# Patient Record
Sex: MALE | Race: WHITE | URBAN - NONMETROPOLITAN AREA
[De-identification: names, ages, dates, MRNs, and addresses within clinical notes are randomized per-mention and may not be internally consistent; named-entity substitution may affect disease eponyms.]

---

## 2017-11-27 ENCOUNTER — ONCOLOGY VISIT (OUTPATIENT)
Dept: RADIATION ONCOLOGY | Facility: HOSPITAL | Age: 82
End: 2017-11-27
Attending: RADIOLOGY
Payer: MEDICARE

## 2017-11-27 VITALS
BODY MASS INDEX: 30.62 KG/M2 | WEIGHT: 202 LBS | SYSTOLIC BLOOD PRESSURE: 106 MMHG | HEART RATE: 56 BPM | HEIGHT: 68 IN | DIASTOLIC BLOOD PRESSURE: 58 MMHG

## 2017-11-27 DIAGNOSIS — C61 PROSTATE CANCER (H): Primary | ICD-10-CM

## 2017-11-27 PROCEDURE — 99213 OFFICE O/P EST LOW 20 MIN: CPT | Performed by: RADIOLOGY

## 2017-11-27 RX ORDER — GABAPENTIN 300 MG/1
CAPSULE ORAL
COMMUNITY
Start: 2017-08-01

## 2017-11-27 RX ORDER — NITROGLYCERIN 0.4 MG/1
0.4 TABLET SUBLINGUAL
COMMUNITY
Start: 2015-03-16

## 2017-11-27 RX ORDER — HYDROCODONE BITARTRATE AND ACETAMINOPHEN 5; 325 MG/1; MG/1
TABLET ORAL
COMMUNITY
Start: 2018-01-19 | End: 2018-01-10

## 2017-11-27 RX ORDER — INSULIN GLARGINE 100 [IU]/ML
INJECTION, SOLUTION SUBCUTANEOUS
COMMUNITY
Start: 2015-03-16

## 2017-11-27 RX ORDER — TAMSULOSIN HYDROCHLORIDE 0.4 MG/1
0.4 CAPSULE ORAL
COMMUNITY
Start: 2017-03-09

## 2017-11-27 RX ORDER — ATORVASTATIN CALCIUM 20 MG/1
10 TABLET, FILM COATED ORAL
COMMUNITY
Start: 2015-03-16

## 2017-11-27 RX ORDER — WARFARIN SODIUM 5 MG/1
TABLET ORAL
COMMUNITY
Start: 2017-03-24

## 2017-11-27 RX ORDER — LOSARTAN POTASSIUM 25 MG/1
25 TABLET ORAL
COMMUNITY
Start: 2017-07-14

## 2017-11-27 RX ORDER — ISOSORBIDE MONONITRATE 30 MG/1
30 TABLET, EXTENDED RELEASE ORAL
COMMUNITY
Start: 2015-03-16

## 2017-11-27 RX ORDER — BUSPIRONE HYDROCHLORIDE 10 MG/1
TABLET ORAL
COMMUNITY
Start: 2015-03-16

## 2017-11-27 ASSESSMENT — PAIN SCALES - GENERAL: PAINLEVEL: MODERATE PAIN (4)

## 2017-11-27 NOTE — CONSULTS
RADIATION THERAPY CONSULTATION      REFERRING PHYSICIANS:  David Saldaña MD; Marcial Vera DO; Singh Hare MD.      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        HISTORY:  Chelsea Harden has been followed for several years by Dr. Vera and subsequently Dr. Saldaña as well.  He had had issues with rising PSA, previous benign biopsies, urinary retention.  For a while he did, I believe, have an indwelling Garner catheter.  It would appear that his diagnosis of prostate carcinoma dates back to approximately 2015 when his PSA had risen to 18.  He responded somewhat initially to androgen suppression, subsequently had a rising PSA in spite of that, but again has had a nice secondary response in terms of PSA to enzalutamide.  From a symptomatic standpoint, he is doing reasonably well.  Nocturia, one to two times per night.  No rectal problems or diarrhea.      PAST MEDICAL AND SURGICAL HISTORY:   1.  Pacemaker for sick sinus syndrome   2.  Back surgery.      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      ALLERGIES:  ACE inhibitors.      REVIEW OF SYSTEMS:  No diplopia, headaches, dizziness, loss of consciousness.  No swallowing difficulties, odynophagia or dysphagia.  No thermal or temperature intolerance.  No nausea, vomiting, heartburn.  No orthopnea, dyspnea, palpitations.  No abdominal complaints.  Energy level.  Bilateral lower extremity pain which he rates a 4/10.  Diet, general.  Weight, stable.      FAMILY HISTORY:  Two brothers with prostate carcinoma.  Father leukemia.      HABITS:  Alcohol use, occasional.  Tobacco use:  Quit over 20 years ago.      SOCIAL AND DEMOGRAPHIC:  The patient is  and lives with his spouse.  He has 3 adult offspring.  He is a retired truck   from the mines.      PHYSICAL EXAMINATION:   GENERAL:  Reveals a somewhat chronically ill-appearing male.   VITAL SIGNS:  Weight is 201.9 pounds, blood pressure 106/58, heart rate 56.   HEENT:  Extraocular movements, full.  Pupils are equal, round, reactive.  Face, symmetric.  Palate and tongue, midline and symmetric.   NECK:  No cervical or supraclavicular lymphadenopathy.   LUNGS:  Relatively clear to auscultation.   HEART:  Cardiac exam reveals regular rate and rhythm without obvious murmurs or extra sounds.   CHEST:  No axillary adenopathy.   ABDOMEN:  Nontender, without appreciable organomegaly.  No inguinal lymphadenopathy.   EXTREMITIES:  Extremity strength is intact.   NEUROLOGIC:  Deep tendon reflexes, symmetric.      RADIOGRAPHIC FINDINGS:  Several CTs I reviewed over the years and do seem to show a modest-sized prostate without evidence of extensive local disease or extraprostatic extension or adenopathy.      IMPRESSION:  Likely hormone refractory and previously symptomatic prostate carcinoma in an 83-year-old male.  I had a full discussion with him regarding the possible role of radiation therapy in local control of high risk prostate carcinoma.  Certainly no one knows what his future may hold; however, I think we could certainly design a reasonable course of treatment 7-8 weeks, daily weekday outpatient treatment.  I discussed with the patient and his wife side effects, cystitis, proctitis, fatigue, diarrhea, increased urinary frequency, up to and including late complications such as serious rectal or bladder injury, possibly a 2% risk of ulceration or fistula and loss of either of these organ systems.  All in all, the patient is well informed and wishes to proceed.        PLAN:  Appropriate scheduling will be accomplished for treatment simulation and planning.         DAJA BERNARDO MD             D: 11/27/2017 11:18   T: 11/27/2017 12:07   MT: MARIEL      Name:     JOHN MCMANUS   MRN:       [id number]        Account:       [id number]   :      1934           Consult Date:  2017      Document: [id number]       cc: Singh Oglesby MD

## 2017-11-27 NOTE — PATIENT INSTRUCTIONS
One week before your simulation begin avoiding food that you know causes you gas.    Stay well hydrated throughout treatment.    Two days before your sim start taking Gas-x (Simethicone 125 mg) three times per day, after meals.  Start on Rory 12/3/17     Continue taking Gas-X every day until you are finished with your radiation treatments. (Including weekends).    Do your best to have a bowel movement before every appointment    On the day of your simulation:      Do not eat or drink anything for 2 hours before your appointment.  Stop eating/drinking at 8:30 a.m.      Empty bladder and drink 12 ounces of water at 9:45 a.m.  DO NOT empty bladder again until after your appointment.    Continue this throughout treatment.

## 2017-11-27 NOTE — PROGRESS NOTES
"INITIAL PATIENT ASSESSMENT    Referring Physician: Tray  Other Physicians: Ananya Vera    Diagnosis: Prostate Cancer    Prior radiation therapy: None    Prior chemotherapy: None    Prior hormonal therapy:Yes: Lupron    Pain Eval:  Current history of pain associated with this visit:   Intensity: 4/10  Current: aching  Location: bilat. legs  Treatment: hydrocodone      Psychosocial  Marital Status:    Spouse/Significant other: Jaja   Children: 3   Occupation: -mines    Retired: Yes  Living arrangements: home with wife  Do you feel safe at home? Yes  Activity status: ambulates with assistive device   referral needs: Not needed    Advanced Directive: No    Patient was assessed for the influenza, pneumo-poly, prevnar 13, Tdap, and shingles immunizations. \"Vaccinations and Cancer Treatment\" flyer given.  Instructed patient to discuss vaccination status with his/her PCM.   Patient was assessed using the NCCN psychosocial distress thermometer. Patient rated the score as a 6/10. Patient rated current stressors as \"can't do nothing\"--related to his back pain and leg numbness. Has an appointment in [place] for his back later this week. Stressors will be brought to the attention of provider or Oncology RN Care Coordinator for a score of 6 or greater or per nurses discretion.     Pt is here today for a consult for radiation therapy for prostate cancer.  Educated patient on the mapping process and the possible side effects of XRT to the esophagus, to include: fatigue, skin reaction, diarrhea, and bladder irritation.  Pt verbalizes an understanding and has no questions at this time. Pt is accompanied by his wife, Jaja today.    ROLevel 3- Verification of admission data and rooming completed.  Verification of medication and allergies completed.  Education per individual patient/family needs completed and documented.  Assessment and side-effect management completed.  Necessary " information gathered and reported to provider, time spent assisting patient or coordinating patient needs approximately 45 minutes.  Ludmila Silver RN

## 2017-11-27 NOTE — MR AVS SNAPSHOT
After Visit Summary   11/27/2017    Chelsea Harden    MRN: [id number]           Patient Information     Date Of Birth          2/22/1934        Visit Information        Provider Department      11/27/2017 10:00 AM Emerson Costello MD [facility]        Care Instructions      One week before your simulation begin avoiding food that you know causes you gas.    Stay well hydrated throughout treatment.    Two days before your sim start taking Gas-x (Simethicone 125 mg) three times per day, after meals.  Start on Rory 12/3/17     Continue taking Gas-X every day until you are finished with your radiation treatments. (Including weekends).    Do your best to have a bowel movement before every appointment    On the day of your simulation:      Do not eat or drink anything for 2 hours before your appointment.  Stop eating/drinking at 8:30 a.m.      Empty bladder and drink 12 ounces of water at 9:45 a.m.  DO NOT empty bladder again until after your appointment.    Continue this throughout treatment.              Follow-ups after your visit        Your next 10 appointments already scheduled     Dec 05, 2017 10:30 AM CST   Simulation with [facility]   [facility] ([facility] )    [address] [place] [id number] [phone]              Who to contact     If you have questions or need follow up information about today's clinic visit or your schedule please contact [facility] directly at [phone].  Normal or non-critical lab and imaging results will be communicated to you by MyChart, letter or phone within 4 business days after the clinic has received the results. If you do not hear from us within 7 days, please contact the clinic through MyChart or phone. If you have a critical or abnormal lab result, we will notify you by phone as soon as possible.  Submit refill requests through d[employer]out or call your pharmacy and they will forward the refill  "request to us. Please allow 3 business days for your refill to be completed.          Additional Information About Your Visit        MyChart Information     [employer] lets you send messages to your doctor, view your test results, renew your prescriptions, schedule appointments and more. To sign up, go to [email or web address] . Click on \"Log in\" on the left side of the screen, which will take you to the Welcome page. Then click on \"Sign up Now\" on the right side of the page.     You will be asked to enter the access code listed below, as well as some personal information. Please follow the directions to create your username and password.     Your access code is: [email or web address]  Expires: 2018 11:17 AM     Your access code will  in 90 days. If you need help or a new code, please call your [facility] clinic or [phone].        Care EveryWhere ID     This is your Care EveryWhere ID. This could be used by other organizations to access your [facility] medical records  [id number]        Your Vitals Were     Pulse Height BMI (Body Mass Index)             56 1.727 m (5' 8\") 30.71 kg/m2          Blood Pressure from Last 3 Encounters:   17 106/58    Weight from Last 3 Encounters:   17 91.6 kg (202 lb)              We Performed the Following     Full Code        Primary Care Provider    None Specified       No primary provider on file.        Equal Access to Services     [facility]: Hadii chio arguetao Solazarus, waaxda luqadaha, qaybta kaalmada shravan, mayito sorensen . So [facility] [phone].    ATENCIÓN: Si habla español, tiene a ramirez disposición servicios gratuitos de asistencia lingüística. Llame al [phone].    We comply with applicable federal civil rights laws and [place] laws. We do not discriminate on the basis of race, color, national origin, age, disability, sex, sexual orientation, or gender identity.            Thank you!     Thank you for choosing [facility]  for your care. Our goal is always to provide you with excellent care. Hearing back from our patients is one way we can continue to improve our services. Please take a few minutes to complete the written survey that you may receive in the mail after your visit with us. Thank you!             Your Updated Medication List - Protect others around you: Learn how to safely use, store and throw away your medicines at www.disposemymeds.org.          This list is accurate as of: 11/27/17 11:21 AM.  Always use your most recent med list.                   Brand Name Dispense Instructions for use Diagnosis    atorvastatin 20 MG tablet    LIPITOR     Take 10 mg by mouth        busPIRone 10 MG tablet    BUSPAR     1 1/2 tabs twice daily        enzalutamide 40 MG capsule    XTANDI     Take 80 mg by mouth        gabapentin 300 MG capsule    NEURONTIN     2 tablets morning, 2 afternoon, 3 at bedtime.        HYDROcodone-acetaminophen 5-325 MG per tablet   Start taking on:  1/19/2018    NORCO     ONE  TABLET  TWICE PRN PAIN Limit acetaminophen to 4000 mg per day from all sources.        insulin glargine 100 UNIT/ML injection    LANTUS     Inject 30 units daily under the skin        ipratropium 17 MCG/ACT Inhaler    ATROVENT HFA     Inhale 2 puffs into the lungs        isosorbide mononitrate 30 MG 24 hr tablet    IMDUR     Take 30 mg by mouth        leuprolide 45 MG kit    LUPRON DEPOT     SHOT EVERY 6 MONTHS        losartan 25 MG tablet    COZAAR     Take 25 mg by mouth        metFORMIN 500 MG tablet    GLUCOPHAGE     Take 500 mg by mouth        nitroGLYcerin 0.4 MG sublingual tablet    NITROSTAT     Place 0.4 mg under the tongue        tamsulosin 0.4 MG capsule    FLOMAX     Take 0.4 mg by mouth        warfarin 5 MG tablet    COUMADIN     2.5 mg daily   7.5 mg M-F  Monitored by irena BOLANOS

## 2017-12-05 ENCOUNTER — ALLIED HEALTH/NURSE VISIT (OUTPATIENT)
Dept: RADIATION ONCOLOGY | Facility: HOSPITAL | Age: 82
End: 2017-12-05
Attending: RADIOLOGY
Payer: MEDICARE

## 2017-12-05 DIAGNOSIS — C61 PROSTATE CANCER (H): Primary | ICD-10-CM

## 2017-12-05 PROCEDURE — 77334 RADIATION TREATMENT AID(S): CPT | Performed by: RADIOLOGY

## 2017-12-05 NOTE — MR AVS SNAPSHOT
After Visit Summary   12/5/2017    Chelsea Harden    MRN: [id number]           Patient Information     Date Of Birth          2/22/1934        Visit Information        Provider Department      12/5/2017 10:30 AM [facility]        Today's Diagnoses     Prostate cancer (H)    -  1       Follow-ups after your visit        Your next 10 appointments already scheduled     Dec 14, 2017  3:00 PM CST   Treatment with [facility] CL[facility] IX   [facility] ([facility] )    [address] [address]   [phone]            Dec 15, 2017 10:45 AM CST   Treatment with [facility]   [facility] ([facility] )    [address] [address]   [phone]            Dec 18, 2017 10:45 AM CST   Treatment with [facility]   [facility] ([facility] )    [address] [address]   [phone]            Dec 19, 2017 10:45 AM CST   Treatment with [facility]   [facility] ([facility] )    [address] [address]   [phone]            Dec 20, 2017 10:45 AM CST   Treatment with [facility]   [facility] ([facility] )    [address] [address]   [phone]            Dec 21, 2017 10:45 AM CST   Treatment with [facility]   [facility] ([facility] )    [address] [address]   [phone]            Dec 22, 2017 10:45 AM CST   Treatment with [facility]   [facility] ([facility] )    [address] [address]   [phone]            Dec 26, 2017 10:45 AM CST   Treatment with [facility]   [facility] ([facility] )    [address] [address]   [phone]            Dec 27, 2017 10:45 AM CST   Treatment with [facility]   [facility] ([facility] )  "   [address] [id number] [phone]            Dec 28, 2017 10:45 AM CST   Treatment with [facility] CL[facility] IX   [facility] ([facility] )    [address] [id number] [phone]              Who to contact     If you have questions or need follow up information about today's clinic visit or your schedule please contact [facility] directly at [phone].  Normal or non-critical lab and imaging results will be communicated to you by [employer], letter or phone within 4 business days after the clinic has received the results. If you do not hear from us within 7 days, please contact the clinic through [employer] or phone. If you have a critical or abnormal lab result, we will notify you by phone as soon as possible.  Submit refill requests through "[employer] or call your pharmacy and they will forward the refill request to us. Please allow 3 business days for your refill to be completed.          Additional Information About Your Visit        "[employer] Information     "[employer] lets you send messages to your doctor, view your test results, renew your prescriptions, schedule appointments and more. To sign up, go to [email or web address] . Click on \"Log in\" on the left side of the screen, which will take you to the Welcome page. Then click on \"Sign up Now\" on the right side of the page.     You will be asked to enter the access code listed below, as well as some personal information. Please follow the directions to create your username and password.     Your access code is: [email or web address]  Expires: 2018 11:17 AM     Your access code will  in 90 days. If you need help or a new code, please call your [facility] clinic or [phone].        Care EveryWhere ID     This is your Care EveryWhere ID. This could be used by other organizations to access your [facility] medical records  [id number]         Blood Pressure from Last 3 Encounters:   17 " 106/58    Weight from Last 3 Encounters:   11/27/17 91.6 kg (202 lb)              Today, you had the following     No orders found for display       Primary Care Provider    None Specified       No primary provider on file.        Equal Access to Services     [facility] : Hadii chio fields wild Lombardo, wasarahda luqadaha, qaybta kaalmada shravan, mayito farrisdyllan hany. So [facility] [phone].    ATENCIÓN: Si habla español, tiene a ramirez disposición servicios gratuitos de asistencia lingüística. Llame al [phone].    We comply with applicable federal civil rights laws and [place] laws. We do not discriminate on the basis of race, color, national origin, age, disability, sex, sexual orientation, or gender identity.            Thank you!     Thank you for choosing [facility]  for your care. Our goal is always to provide you with excellent care. Hearing back from our patients is one way we can continue to improve our services. Please take a few minutes to complete the written survey that you may receive in the mail after your visit with us. Thank you!             Your Updated Medication List - Protect others around you: Learn how to safely use, store and throw away your medicines at www.disposemymeds.org.          This list is accurate as of: 12/5/17  1:30 PM.  Always use your most recent med list.                   Brand Name Dispense Instructions for use Diagnosis    atorvastatin 20 MG tablet    LIPITOR     Take 10 mg by mouth        busPIRone 10 MG tablet    BUSPAR     1 1/2 tabs twice daily        enzalutamide 40 MG capsule    XTANDI     Take 80 mg by mouth        gabapentin 300 MG capsule    NEURONTIN     2 tablets morning, 2 afternoon, 3 at bedtime.        HYDROcodone-acetaminophen 5-325 MG per tablet   Start taking on:  1/19/2018    NORCO     ONE  TABLET  TWICE PRN PAIN Limit acetaminophen to 4000 mg per day from all sources.        insulin glargine 100 UNIT/ML injection    LANTUS      Inject 30 units daily under the skin        ipratropium 17 MCG/ACT Inhaler    ATROVENT HFA     Inhale 2 puffs into the lungs        isosorbide mononitrate 30 MG 24 hr tablet    IMDUR     Take 30 mg by mouth        leuprolide 45 MG kit    LUPRON DEPOT     SHOT EVERY 6 MONTHS        losartan 25 MG tablet    COZAAR     Take 25 mg by mouth        metFORMIN 500 MG tablet    GLUCOPHAGE     Take 500 mg by mouth        nitroGLYcerin 0.4 MG sublingual tablet    NITROSTAT     Place 0.4 mg under the tongue        tamsulosin 0.4 MG capsule    FLOMAX     Take 0.4 mg by mouth        warfarin 5 MG tablet    COUMADIN     2.5 mg daily   7.5 mg M-F  Monitored by irena BOLANOS

## 2017-12-12 PROCEDURE — 77338 DESIGN MLC DEVICE FOR IMRT: CPT | Performed by: RADIOLOGY

## 2017-12-12 PROCEDURE — 77300 RADIATION THERAPY DOSE PLAN: CPT | Performed by: RADIOLOGY

## 2017-12-12 PROCEDURE — 77370 RADIATION PHYSICS CONSULT: CPT | Performed by: RADIOLOGY

## 2017-12-13 PROCEDURE — 77301 RADIOTHERAPY DOSE PLAN IMRT: CPT | Performed by: RADIOLOGY

## 2017-12-14 ENCOUNTER — ALLIED HEALTH/NURSE VISIT (OUTPATIENT)
Dept: RADIATION ONCOLOGY | Facility: HOSPITAL | Age: 82
End: 2017-12-14
Attending: RADIOLOGY
Payer: MEDICARE

## 2017-12-14 DIAGNOSIS — C61 PROSTATE CANCER (H): Primary | ICD-10-CM

## 2017-12-14 PROCEDURE — 77385 ZZH IMRT TREATMENT DELIVERY, SIMPLE: CPT | Performed by: RADIOLOGY

## 2017-12-14 NOTE — MR AVS SNAPSHOT
After Visit Summary   12/14/2017    Chelsea Harden    MRN: [id number]           Patient Information     Date Of Birth          2/22/1934        Visit Information        Provider Department      12/14/2017 3:00 PM [facility]        Today's Diagnoses     Prostate cancer (H)    -  1       Follow-ups after your visit        Your next 10 appointments already scheduled     Dec 15, 2017 10:45 AM CST   Treatment with [facility]   [facility] ([facility] )    [address] [address]   [phone]            Dec 18, 2017 10:45 AM CST   Treatment with [facility]   [facility] ([facility] )    [address] [address]   [phone]            Dec 19, 2017 10:45 AM CST   Treatment with [facility]   [facility] ([facility] )    [address] [address]   [phone]            Dec 20, 2017 10:45 AM CST   Treatment with [facility]   [facility] ([facility] )    [address] [address]   [phone]            Dec 21, 2017 10:45 AM CST   Treatment with [facility]   [facility] ([facility] )    [address] [address]   [phone]            Dec 22, 2017 10:45 AM CST   Treatment with [facility]   [facility] ([facility] )    [address] [address]   [phone]            Dec 26, 2017 10:45 AM CST   Treatment with [facility]   [facility] ([facility] )    [address] [address]   [phone]            Dec 27, 2017 10:45 AM CST   Treatment with [facility]   [facility] ([facility] )    [address] [address]   [phone]            Dec 28, 2017 10:45 AM CST   Treatment with [facility]   [facility] Radiation Oncology ([facility] )  "   [address] [id number] [phone]            Dec 29, 2017 10:45 AM CST   Treatment with [facility]   [facility] Radiation Oncology ([facility] )    [address] [id number] [phone]              Who to contact     If you have questions or need follow up information about today's clinic visit or your schedule please contact [facility] directly at [phone].  Normal or non-critical lab and imaging results will be communicated to you by [employer], letter or phone within 4 business days after the clinic has received the results. If you do not hear from us within 7 days, please contact the clinic through [employer] or phone. If you have a critical or abnormal lab result, we will notify you by phone as soon as possible.  Submit refill requests through "[employer] or call your pharmacy and they will forward the refill request to us. Please allow 3 business days for your refill to be completed.          Additional Information About Your Visit        "[employer] Information     "[employer] lets you send messages to your doctor, view your test results, renew your prescriptions, schedule appointments and more. To sign up, go to [email or web address] . Click on \"Log in\" on the left side of the screen, which will take you to the Welcome page. Then click on \"Sign up Now\" on the right side of the page.     You will be asked to enter the access code listed below, as well as some personal information. Please follow the directions to create your username and password.     Your access code is: [email or web address]  Expires: 2018 11:17 AM     Your access code will  in 90 days. If you need help or a new code, please call your [place] clinic or [phone].        Care EveryWhere ID     This is your Care EveryWhere ID. This could be used by other organizations to access your [place] medical records  [id number]         Blood Pressure from Last 3 Encounters:   17 " 106/58    Weight from Last 3 Encounters:   11/27/17 91.6 kg (202 lb)              Today, you had the following     No orders found for display       Primary Care Provider    None Specified       No primary provider on file.        Equal Access to Services     SOULEYMANEEZEQUIEL MARILOU : Hadii chio fields wild Lombardo, wasarahda luqadaha, qaybta kaalmada shravan, mayito jordan laRiandyllan hany. So [facility] [phone].    ATENCIÓN: Si habla español, tiene a ramirez disposición servicios gratuitos de asistencia lingüística. Llame al [phone].    We comply with applicable federal civil rights laws and [place] laws. We do not discriminate on the basis of race, color, national origin, age, disability, sex, sexual orientation, or gender identity.            Thank you!     Thank you for choosing [facility]  for your care. Our goal is always to provide you with excellent care. Hearing back from our patients is one way we can continue to improve our services. Please take a few minutes to complete the written survey that you may receive in the mail after your visit with us. Thank you!             Your Updated Medication List - Protect others around you: Learn how to safely use, store and throw away your medicines at www.disposemymeds.org.          This list is accurate as of: 12/14/17  3:17 PM.  Always use your most recent med list.                   Brand Name Dispense Instructions for use Diagnosis    atorvastatin 20 MG tablet    LIPITOR     Take 10 mg by mouth        busPIRone 10 MG tablet    BUSPAR     1 1/2 tabs twice daily        enzalutamide 40 MG capsule    XTANDI     Take 80 mg by mouth        gabapentin 300 MG capsule    NEURONTIN     2 tablets morning, 2 afternoon, 3 at bedtime.        HYDROcodone-acetaminophen 5-325 MG per tablet   Start taking on:  1/19/2018    NORCO     ONE  TABLET  TWICE PRN PAIN Limit acetaminophen to 4000 mg per day from all sources.        insulin glargine 100 UNIT/ML injection     LANTUS     Inject 30 units daily under the skin        ipratropium 17 MCG/ACT Inhaler    ATROVENT HFA     Inhale 2 puffs into the lungs        isosorbide mononitrate 30 MG 24 hr tablet    IMDUR     Take 30 mg by mouth        leuprolide 45 MG kit    LUPRON DEPOT     SHOT EVERY 6 MONTHS        losartan 25 MG tablet    COZAAR     Take 25 mg by mouth        metFORMIN 500 MG tablet    GLUCOPHAGE     Take 500 mg by mouth        nitroGLYcerin 0.4 MG sublingual tablet    NITROSTAT     Place 0.4 mg under the tongue        tamsulosin 0.4 MG capsule    FLOMAX     Take 0.4 mg by mouth        warfarin 5 MG tablet    COUMADIN     2.5 mg daily   7.5 mg M-F  Monitored by irena BOLANOS

## 2017-12-14 NOTE — PROGRESS NOTES
Chelsea Harden received radiation therapy treatment today 12/14/17.    Mary Avila  December 14, 2017  3:05 PM

## 2017-12-15 ENCOUNTER — ALLIED HEALTH/NURSE VISIT (OUTPATIENT)
Dept: RADIATION ONCOLOGY | Facility: HOSPITAL | Age: 82
End: 2017-12-15

## 2017-12-15 DIAGNOSIS — C61 PROSTATE CANCER (H): Primary | ICD-10-CM

## 2017-12-15 PROCEDURE — 77385 ZZH IMRT TREATMENT DELIVERY, SIMPLE: CPT | Performed by: RADIOLOGY

## 2017-12-15 NOTE — MR AVS SNAPSHOT
After Visit Summary   12/15/2017    Chelsea Harden    MRN: [id number]           Patient Information     Date Of Birth          2/22/1934        Visit Information        Provider Department      12/15/2017 10:45 AM [facility]        Today's Diagnoses     Prostate cancer (H)    -  1       Follow-ups after your visit        Your next 10 appointments already scheduled     Dec 18, 2017 10:45 AM CST   Treatment with [facility]   [facility] Radiation Oncology ([facility] )    [address] [address]   [phone]            Dec 19, 2017 10:45 AM CST   Treatment with [facility]   [facility] Radiation Oncology ([facility] )    [address] [address]   [phone]            Dec 20, 2017 10:45 AM CST   Treatment with [facility]   [facility] Rad[facility]cology ([facility] )    [address] [address]   [phone]            Dec 21, 2017 10:45 AM CST   Treatment with [facility]   [facility] Rad[facility] Oncology ([facility] )    [address] [address]   [phone]            Dec 22, 2017 10:45 AM CST   Treatment with [facility]   [facility] ([facility] )    [address] [address]   [phone]            Dec 26, 2017 10:45 AM CST   Treatment with [facility]   [facility] ([facility] )    [address] [address]   [phone]            Dec 27, 2017 10:45 AM CST   Treatment with [facility]   [facility]cology ([facility] )    [address] [address]   [phone]            Dec 28, 2017 10:45 AM CST   Treatment with [facility]   [facility] ([facility] )    [address] [address]   [phone]            Dec 29, 2017 10:45 AM CST   Treatment with [facility]   [facility] ([facility] )  "   [address] [phone] [phone]            2018 10:45 AM CST   Treatment with [facility]   [facility] Radiation Oncology ([facility] )    [address] [address] [phone]              Who to contact     If you have questions or need follow up information about today's clinic visit or your schedule please contact [facility] directly at [phone].  Normal or non-critical lab and imaging results will be communicated to you by [employer], letter or phone within 4 business days after the clinic has received the results. If you do not hear from us within 7 days, please contact the clinic through [employer] or phone. If you have a critical or abnormal lab result, we will notify you by phone as soon as possible.  Submit refill requests through [employer] or call your pharmacy and they will forward the refill request to us. Please allow 3 business days for your refill to be completed.          Additional Information About Your Visit        [employer] Information     [employer] lets you send messages to your doctor, view your test results, renew your prescriptions, schedule appointments and more. To sign up, go to [email or web address] . Click on \"Log in\" on the left side of the screen, which will take you to the Welcome page. Then click on \"Sign up Now\" on the right side of the page.     You will be asked to enter the access code listed below, as well as some personal information. Please follow the directions to create your username and password.     Your access code is: [email or web address]  Expires: 2018 11:17 AM     Your access code will  in 90 days. If you need help or a new code, please call your [place] clinic or [phone].        Care EveryWhere ID     This is your Care EveryWhere ID. This could be used by other organizations to access your [place] medical records  [id number]         Blood Pressure from Last 3 Encounters:   17 " 106/58    Weight from Last 3 Encounters:   11/27/17 91.6 kg (202 lb)              Today, you had the following     No orders found for display       Primary Care Provider    None Specified       No primary provider on file.        Equal Access to Services     SOULEYMANEEZEQUIEL MARILOU : Hadii chio fields wild [place]ardo, wasarahda luqadaha, qaybta kaalmada shravan, mayito jordan laRiandyllan leach. So [facility] [phone].    ATENCIÓN: Si habla español, tiene a ramirez disposición servicios gratuitos de asistencia lingüística. Llame al [phone].    We comply with applicable federal civil rights laws and [place] laws. We do not discriminate on the basis of race, color, national origin, age, disability, sex, sexual orientation, or gender identity.            Thank you!     Thank you for choosing [facility]  for your care. Our goal is always to provide you with excellent care. Hearing back from our patients is one way we can continue to improve our services. Please take a few minutes to complete the written survey that you may receive in the mail after your visit with us. Thank you!             Your Updated Medication List - Protect others around you: Learn how to safely use, store and throw away your medicines at www.disposemymeds.org.          This list is accurate as of: 12/15/17 11:00 AM.  Always use your most recent med list.                   Brand Name Dispense Instructions for use Diagnosis    atorvastatin 20 MG tablet    LIPITOR     Take 10 mg by mouth        busPIRone 10 MG tablet    BUSPAR     1 1/2 tabs twice daily        enzalutamide 40 MG capsule    XTANDI     Take 80 mg by mouth        gabapentin 300 MG capsule    NEURONTIN     2 tablets morning, 2 afternoon, 3 at bedtime.        HYDROcodone-acetaminophen 5-325 MG per tablet   Start taking on:  1/19/2018    NORCO     ONE  TABLET  TWICE PRN PAIN Limit acetaminophen to 4000 mg per day from all sources.        insulin glargine 100 UNIT/ML injection     LANTUS     Inject 30 units daily under the skin        ipratropium 17 MCG/ACT Inhaler    ATROVENT HFA     Inhale 2 puffs into the lungs        isosorbide mononitrate 30 MG 24 hr tablet    IMDUR     Take 30 mg by mouth        leuprolide 45 MG kit    LUPRON DEPOT     SHOT EVERY 6 MONTHS        losartan 25 MG tablet    COZAAR     Take 25 mg by mouth        metFORMIN 500 MG tablet    GLUCOPHAGE     Take 500 mg by mouth        nitroGLYcerin 0.4 MG sublingual tablet    NITROSTAT     Place 0.4 mg under the tongue        tamsulosin 0.4 MG capsule    FLOMAX     Take 0.4 mg by mouth        warfarin 5 MG tablet    COUMADIN     2.5 mg daily   7.5 mg M-F  Monitored by irena BOLANOS

## 2017-12-15 NOTE — PROGRESS NOTES
Chelsea Harden received radiation therapy treatment today 12/15/17.    Marcelo Payne  December 15, 2017  10:54 AM

## 2017-12-18 ENCOUNTER — ALLIED HEALTH/NURSE VISIT (OUTPATIENT)
Dept: RADIATION ONCOLOGY | Facility: HOSPITAL | Age: 82
End: 2017-12-18

## 2017-12-18 DIAGNOSIS — C61 PROSTATE CANCER (H): Primary | ICD-10-CM

## 2017-12-18 PROCEDURE — 77385 ZZH IMRT TREATMENT DELIVERY, SIMPLE: CPT | Performed by: RADIOLOGY

## 2017-12-18 NOTE — PROGRESS NOTES
Chelsea Harden received radiation therapy treatment today 12/18/17.    Josias Pizano  December 18, 2017  10:45 AM

## 2017-12-18 NOTE — MR AVS SNAPSHOT
After Visit Summary   12/18/2017    Chelsea Harden    MRN: [id number]           Patient Information     Date Of Birth          2/22/1934        Visit Information        Provider Department      12/18/2017 10:45 AM [facility]        Today's Diagnoses     Prostate cancer (H)    -  1       Follow-ups after your visit        Your next 10 appointments already scheduled     Dec 19, 2017 10:45 AM CST   [facility]    [address] [address]   [phone]            Dec 20, 2017 10:45 AM CST   Treatment with [facility]    [address] [address]   [phone]            Dec 21, 2017 10:45 AM CST   Treatment with [facility]    [address] [address]   [phone]            Dec 22, 2017 10:45 AM CST   Treatment with [facility]    [address] [address]   [phone]            Dec 26, 2017 10:45 AM CST   Treatment with [facility]    [address] [address]   [phone]            Dec 27, 2017 10:45 AM CST   Treatment with [facility]    [address] [address]   [phone]            Dec 28, 2017 10:45 AM CST   Treatment with [facility]    [address] [address]   [phone]            Dec 29, 2017 10:45 AM CST   Treatment with [facility]    [address] [address]   [phone]            Jan 02, 2018 10:45 AM CST   Treatment with [facility]   [facility]  "   [address] [phone] [phone]            2018 10:30 AM CST   Treatment with [facility]   [facility] Radiation Oncology ([facility] )    [address] [address] [phone]              Who to contact     If you have questions or need follow up information about today's clinic visit or your schedule please contact [facility] directly at [phone].  Normal or non-critical lab and imaging results will be communicated to you by [employer], letter or phone within 4 business days after the clinic has received the results. If you do not hear from us within 7 days, please contact the clinic through [employer] or phone. If you have a critical or abnormal lab result, we will notify you by phone as soon as possible.  Submit refill requests through [employer] or call your pharmacy and they will forward the refill request to us. Please allow 3 business days for your refill to be completed.          Additional Information About Your Visit        [employer] Information     [employer] lets you send messages to your doctor, view your test results, renew your prescriptions, schedule appointments and more. To sign up, go to [email or web address] . Click on \"Log in\" on the left side of the screen, which will take you to the Welcome page. Then click on \"Sign up Now\" on the right side of the page.     You will be asked to enter the access code listed below, as well as some personal information. Please follow the directions to create your username and password.     Your access code is: [email or web address]  Expires: 2018 11:17 AM     Your access code will  in 90 days. If you need help or a new code, please call your [place] clinic or [phone].        Care EveryWhere ID     This is your Care EveryWhere ID. This could be used by other organizations to access your [place] medical records  [id number]         Blood Pressure from Last 3 Encounters:   17 " 106/58    Weight from Last 3 Encounters:   11/27/17 91.6 kg (202 lb)              Today, you had the following     No orders found for display       Primary Care Provider    None Specified       No primary provider on file.        Equal Access to Services     SOULEYMANEEZEQUIEL MARILOU : Hadii chio fields wild [place]ardo, wasarahda luqadaha, qaybta kaalmada shravan, mayito jordan laRiandyllan hany. So [facility] [phone].    ATENCIÓN: Si habla español, tiene a ramirez disposición servicios gratuitos de asistencia lingüística. Llame al [phone].    We comply with applicable federal civil rights laws and [place] laws. We do not discriminate on the basis of race, color, national origin, age, disability, sex, sexual orientation, or gender identity.            Thank you!     Thank you for choosing [facility]  for your care. Our goal is always to provide you with excellent care. Hearing back from our patients is one way we can continue to improve our services. Please take a few minutes to complete the written survey that you may receive in the mail after your visit with us. Thank you!             Your Updated Medication List - Protect others around you: Learn how to safely use, store and throw away your medicines at www.disposemymeds.org.          This list is accurate as of: 12/18/17 10:48 AM.  Always use your most recent med list.                   Brand Name Dispense Instructions for use Diagnosis    atorvastatin 20 MG tablet    LIPITOR     Take 10 mg by mouth        busPIRone 10 MG tablet    BUSPAR     1 1/2 tabs twice daily        enzalutamide 40 MG capsule    XTANDI     Take 80 mg by mouth        gabapentin 300 MG capsule    NEURONTIN     2 tablets morning, 2 afternoon, 3 at bedtime.        HYDROcodone-acetaminophen 5-325 MG per tablet   Start taking on:  1/19/2018    NORCO     ONE  TABLET  TWICE PRN PAIN Limit acetaminophen to 4000 mg per day from all sources.        insulin glargine 100 UNIT/ML injection     LANTUS     Inject 30 units daily under the skin        ipratropium 17 MCG/ACT Inhaler    ATROVENT HFA     Inhale 2 puffs into the lungs        isosorbide mononitrate 30 MG 24 hr tablet    IMDUR     Take 30 mg by mouth        leuprolide 45 MG kit    LUPRON DEPOT     SHOT EVERY 6 MONTHS        losartan 25 MG tablet    COZAAR     Take 25 mg by mouth        metFORMIN 500 MG tablet    GLUCOPHAGE     Take 500 mg by mouth        nitroGLYcerin 0.4 MG sublingual tablet    NITROSTAT     Place 0.4 mg under the tongue        tamsulosin 0.4 MG capsule    FLOMAX     Take 0.4 mg by mouth        warfarin 5 MG tablet    COUMADIN     2.5 mg daily   7.5 mg M-F  Monitored by irena BOLANOS

## 2017-12-19 ENCOUNTER — ALLIED HEALTH/NURSE VISIT (OUTPATIENT)
Dept: RADIATION ONCOLOGY | Facility: HOSPITAL | Age: 82
End: 2017-12-19

## 2017-12-19 DIAGNOSIS — C61 PROSTATE CANCER (H): Primary | ICD-10-CM

## 2017-12-19 PROCEDURE — 77385 ZZH IMRT TREATMENT DELIVERY, SIMPLE: CPT | Performed by: RADIOLOGY

## 2017-12-19 NOTE — PROGRESS NOTES
Chelsea Harden received radiation therapy treatment today 12/19/17.    Argentina Xie  December 19, 2017  11:20 AM

## 2017-12-20 ENCOUNTER — OFFICE VISIT (OUTPATIENT)
Dept: RADIATION ONCOLOGY | Facility: HOSPITAL | Age: 82
End: 2017-12-20
Attending: RADIOLOGY
Payer: MEDICARE

## 2017-12-20 ENCOUNTER — ALLIED HEALTH/NURSE VISIT (OUTPATIENT)
Dept: RADIATION ONCOLOGY | Facility: HOSPITAL | Age: 82
End: 2017-12-20

## 2017-12-20 VITALS
HEART RATE: 60 BPM | RESPIRATION RATE: 16 BRPM | BODY MASS INDEX: 31.02 KG/M2 | SYSTOLIC BLOOD PRESSURE: 124 MMHG | DIASTOLIC BLOOD PRESSURE: 66 MMHG | WEIGHT: 204 LBS

## 2017-12-20 DIAGNOSIS — C61 PROSTATE CANCER (H): Primary | ICD-10-CM

## 2017-12-20 PROCEDURE — 77336 RADIATION PHYSICS CONSULT: CPT | Performed by: RADIOLOGY

## 2017-12-20 PROCEDURE — 77385 ZZH IMRT TREATMENT DELIVERY, SIMPLE: CPT | Performed by: RADIOLOGY

## 2017-12-20 ASSESSMENT — PAIN SCALES - GENERAL: PAINLEVEL: MODERATE PAIN (4)

## 2017-12-20 NOTE — PROGRESS NOTES
Chelsea Harden received radiation therapy treatment today 12/20/17.    Josias Pizano  December 20, 2017  10:57 AM

## 2017-12-20 NOTE — PROGRESS NOTES
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      RADIATION RX:  John Mcmanus has received 950 cGy to date for potential local management of the above-described prostate carcinoma.      SUBJECTIVE:  Doing fairly well.  No change in bowel or bladder symptomatology.  Nocturia, generally one to two times per night.  No symptoms of proctitis or diarrhea.      OBJECTIVE:  Weight, 204 pounds.  Abdomen is benign.  Bowel sounds, present.      IMPRESSION:  Routine tolerance to radiation therapy for prostate carcinoma.      PLAN:  Continue treatment as planned.         DAJA BERNARDO MD             D: 2017 11:33   T: 2017 11:47   MT: CG      Name:     JOHN MCMANUS   MRN:      [id number]        Account:      [id number]   :      1934           Service Date: 2017      Document: [id number]       cc: Singh Oglesby MD

## 2017-12-20 NOTE — MR AVS SNAPSHOT
After Visit Summary   12/20/2017    Chelsea Harden    MRN: [id number]           Patient Information     Date Of Birth          2/22/1934        Visit Information        Provider Department      12/20/2017 10:45 AM [facility]        Today's Diagnoses     Prostate cancer (H)    -  1       Follow-ups after your visit        Your next 10 appointments already scheduled     Dec 21, 2017 10:45 AM CST   Treatment with [facility]   [facility] ([facility] )    [address] [address]   [phone]            Dec 22, 2017 10:45 AM CST   Treatment with [facility]   [facility] ([facility] )    [address] [address]   [phone]            Dec 26, 2017 10:45 AM CST   Treatment with [facility]   [facility] ([facility] )    [address] [address]   [phone]            Dec 27, 2017 10:45 AM CST   Treatment with [facility]   [facility] ([facility] )    [address] [address]   [phone]            Dec 27, 2017 11:00 AM CST   on treatment visit with Emerson Costello MD   [facility] ([facility] )    [address] [address]   [phone]            Dec 28, 2017 10:45 AM CST   Treatment with [facility]   [facility] ([facility] )    [address] [address]   [phone]            Dec 29, 2017 10:45 AM CST   Treatment with [facility]   [facility] ([facility] )    [address] [address]   [phone]            Jan 02, 2018 10:45 AM CST   Treatment with [facility]   [facility] ([facility] )    [address] [address]   [phone]            Jan 03, 2018 10:30 AM CST   Treatment with [facility]   [facility] Radiation Oncology ([place]  [facility] )    [address] [address] [phone]            2018 11:00 AM CST   on treatment visit with Emerson Costello MD   [facility]cology ([facility] )    [address] [address] [phone]              Who to contact     If you have questions or need follow up information about today's clinic visit or your schedule please contact [facility] directly at [phone].  Normal or non-critical lab and imaging results will be communicated to you by MyChart, letter or phone within 4 business days after the clinic has received the results. If you do not hear from us within 7 days, please contact the clinic through [employer] or phone. If you have a critical or abnormal lab result, we will notify you by phone as soon as possible.  Submit refill requests through [employer] or call your pharmacy and they will forward the refill request to us. Please allow 3 business days for your refill to be completed.          Additional Information About Your Visit        [employer] Information     [employer] lets you send messages to your doctor, view your test results, renew your prescriptions, schedule appointments and more. To sign up, go to [email or web address] . Click on \"Log in\" on the left side of the screen, which will take you to the Welcome page. Then click on \"Sign up Now\" on the right side of the page.     You will be asked to enter the access code listed below, as well as some personal information. Please follow the directions to create your username and password.     Your access code is: [email or web address]  Expires: 2018 11:17 AM     Your access code will  in 90 days. If you need help or a new code, please call your [place] clinic or [phone].        Care EveryWhere ID     This is your Care EveryWhere ID. This could be used by other organizations to access your [place] medical records  [id number]         Blood Pressure from " Last 3 Encounters:   11/27/17 106/58    Weight from Last 3 Encounters:   11/27/17 91.6 kg (202 lb)              Today, you had the following     No orders found for display       Primary Care Provider    None Specified       No primary provider on file.        Equal Access to Services     CRISTINA ZAMARRIPA : Hadhalima chio ku ellieo Sofunmiali, waaxda luqadaha, qaybta kaalmada adecarito, mayito harshain hayaan osmelmarti jordan laRiandyllan . So [facility] [phone].    ATENCIÓN: Si habla español, tiene a ramirez disposición servicios gratuitos de asistencia lingüística. Llame al [phone].    We comply with applicable federal civil rights laws and [place] laws. We do not discriminate on the basis of race, color, national origin, age, disability, sex, sexual orientation, or gender identity.            Thank you!     Thank you for choosing [facility]  for your care. Our goal is always to provide you with excellent care. Hearing back from our patients is one way we can continue to improve our services. Please take a few minutes to complete the written survey that you may receive in the mail after your visit with us. Thank you!             Your Updated Medication List - Protect others around you: Learn how to safely use, store and throw away your medicines at www.disposemymeds.org.          This list is accurate as of: 12/20/17 11:17 AM.  Always use your most recent med list.                   Brand Name Dispense Instructions for use Diagnosis    atorvastatin 20 MG tablet    LIPITOR     Take 10 mg by mouth        busPIRone 10 MG tablet    BUSPAR     1 1/2 tabs twice daily        enzalutamide 40 MG capsule    XTANDI     Take 80 mg by mouth        gabapentin 300 MG capsule    NEURONTIN     2 tablets morning, 2 afternoon, 3 at bedtime.        HYDROcodone-acetaminophen 5-325 MG per tablet   Start taking on:  1/19/2018    NORCO     ONE  TABLET  TWICE PRN PAIN Limit acetaminophen to 4000 mg per day from all sources.        insulin glargine  100 UNIT/ML injection    LANTUS     Inject 30 units daily under the skin        ipratropium 17 MCG/ACT Inhaler    ATROVENT HFA     Inhale 2 puffs into the lungs        isosorbide mononitrate 30 MG 24 hr tablet    IMDUR     Take 30 mg by mouth        leuprolide 45 MG kit    LUPRON DEPOT     SHOT EVERY 6 MONTHS        losartan 25 MG tablet    COZAAR     Take 25 mg by mouth        metFORMIN 500 MG tablet    GLUCOPHAGE     Take 500 mg by mouth        nitroGLYcerin 0.4 MG sublingual tablet    NITROSTAT     Place 0.4 mg under the tongue        tamsulosin 0.4 MG capsule    FLOMAX     Take 0.4 mg by mouth        warfarin 5 MG tablet    COUMADIN     2.5 mg daily   7.5 mg M-F  Monitored by irena BOLANOS

## 2017-12-20 NOTE — MR AVS SNAPSHOT
After Visit Summary   12/20/2017    Chelsea Harden    MRN: [id number]           Patient Information     Date Of Birth          2/22/1934        Visit Information        Provider Department      12/20/2017 11:00 AM Emerson Costello MD [facility]        Today's Diagnoses     Prostate cancer (H)    -  1       Follow-ups after your visit        Your next 10 appointments already scheduled     Dec 21, 2017 10:45 AM CST   Treatment with [facility]   [facility] ([facility] )    [address] [address]   [phone]            Dec 22, 2017 10:45 AM CST   Treatment with [facility]   [facility] ([facility] )    [address] [address]   [phone]            Dec 26, 2017 10:45 AM CST   Treatment with [facility]   [facility] ([facility] )    [address] [address]   [phone]            Dec 27, 2017 10:45 AM CST   Treatment with [facility]   [facility] ([facility] )    [address] [address]   [phone]            Dec 27, 2017 11:00 AM CST   on treatment visit with Emerson Costello MD   [facility] ([facility] )    [address] [address]   [phone]            Dec 28, 2017 10:45 AM CST   Treatment with [facility]   [facility] ([facility] )    [address] [address]   [phone]            Dec 29, 2017 10:45 AM CST   Treatment with [facility]   [facility] ([facility] )    [address] [address]   [phone]            Jan 02, 2018 10:45 AM CST   Treatment with [facility]   [facility] ([facility] )    [address] [address]   [phone]            Jan 03, 2018 10:30 AM CST   Treatment with [facility]   [facility] Rad[facility]  "([facility] )    [address] [address] [phone]            2018 11:00 AM CST   on treatment visit with Emerson Costello MD   [facility] ([facility] )    [address] [address] [phone]              Who to contact     If you have questions or need follow up information about today's clinic visit or your schedule please contact [facility] directly at [phone].  Normal or non-critical lab and imaging results will be communicated to you by MyChart, letter or phone within 4 business days after the clinic has received the results. If you do not hear from us within 7 days, please contact the clinic through [employer] or phone. If you have a critical or abnormal lab result, we will notify you by phone as soon as possible.  Submit refill requests through [employer] or call your pharmacy and they will forward the refill request to us. Please allow 3 business days for your refill to be completed.          Additional Information About Your Visit        [employer] Information     [email or web address] lets you send messages to your doctor, view your test results, renew your prescriptions, schedule appointments and more. To sign up, go to [email or web address] . Click on \"Log in\" on the left side of the screen, which will take you to the Welcome page. Then click on \"Sign up Now\" on the right side of the page.     You will be asked to enter the access code listed below, as well as some personal information. Please follow the directions to create your username and password.     Your access code is: [email or web address]  Expires: 2018 11:17 AM     Your access code will  in 90 days. If you need help or a new code, please call your [place] clinic or [phone].        Care EveryWhere ID     This is your Care EveryWhere ID. This could be used by other organizations to access your [place] medical records  [id number]        Your Vitals " Were     Pulse Respirations BMI (Body Mass Index)             60 16 31.02 kg/m2          Blood Pressure from Last 3 Encounters:   12/20/17 124/66   11/27/17 106/58    Weight from Last 3 Encounters:   12/20/17 92.5 kg (204 lb)   11/27/17 91.6 kg (202 lb)              Today, you had the following     No orders found for display       Primary Care Provider    None Specified       No primary provider on file.        Equal Access to Services     CRISTINA ZAMARRIPA : Hadii chio arguetao Solazarus, waaxda luqadaha, qaybta kaalmada ademartibethanyda, mayito gibson taryntiffany rubinchancemariano sorensen . So [facility] [phone].    ATENCIÓN: Si habla español, tiene a ramirez disposición servicios gratuitos de asistencia lingüística. Llame al [phone].    We comply with applicable federal civil rights laws and [place] laws. We do not discriminate on the basis of race, color, national origin, age, disability, sex, sexual orientation, or gender identity.            Thank you!     Thank you for choosing [facility]  for your care. Our goal is always to provide you with excellent care. Hearing back from our patients is one way we can continue to improve our services. Please take a few minutes to complete the written survey that you may receive in the mail after your visit with us. Thank you!             Your Updated Medication List - Protect others around you: Learn how to safely use, store and throw away your medicines at www.disposemymeds.org.          This list is accurate as of: 12/20/17  3:16 PM.  Always use your most recent med list.                   Brand Name Dispense Instructions for use Diagnosis    atorvastatin 20 MG tablet    LIPITOR     Take 10 mg by mouth        busPIRone 10 MG tablet    BUSPAR     1 1/2 tabs twice daily        enzalutamide 40 MG capsule    XTANDI     Take 80 mg by mouth        gabapentin 300 MG capsule    NEURONTIN     2 tablets morning, 2 afternoon, 3 at bedtime.        HYDROcodone-acetaminophen 5-325 MG per  tablet   Start taking on:  1/19/2018    NORCO     ONE  TABLET  TWICE PRN PAIN Limit acetaminophen to 4000 mg per day from all sources.        insulin glargine 100 UNIT/ML injection    LANTUS     Inject 30 units daily under the skin        ipratropium 17 MCG/ACT Inhaler    ATROVENT HFA     Inhale 2 puffs into the lungs        isosorbide mononitrate 30 MG 24 hr tablet    IMDUR     Take 30 mg by mouth        leuprolide 45 MG kit    LUPRON DEPOT     SHOT EVERY 6 MONTHS        losartan 25 MG tablet    COZAAR     Take 25 mg by mouth        metFORMIN 500 MG tablet    GLUCOPHAGE     Take 500 mg by mouth        nitroGLYcerin 0.4 MG sublingual tablet    NITROSTAT     Place 0.4 mg under the tongue        tamsulosin 0.4 MG capsule    FLOMAX     Take 0.4 mg by mouth        warfarin 5 MG tablet    COUMADIN     2.5 mg daily   7.5 mg M-F  Monitored by irena BOLANOS

## 2017-12-21 ENCOUNTER — ALLIED HEALTH/NURSE VISIT (OUTPATIENT)
Dept: RADIATION ONCOLOGY | Facility: HOSPITAL | Age: 82
End: 2017-12-21

## 2017-12-21 DIAGNOSIS — C61 PROSTATE CANCER (H): Primary | ICD-10-CM

## 2017-12-21 PROCEDURE — 77385 ZZH IMRT TREATMENT DELIVERY, SIMPLE: CPT | Performed by: RADIOLOGY

## 2017-12-21 NOTE — MR AVS SNAPSHOT
After Visit Summary   12/21/2017    Chelsea Harden    MRN: [id number]           Patient Information     Date Of Birth          2/22/1934        Visit Information        Provider Department      12/21/2017 10:45 AM [facility]        Today's Diagnoses     Prostate cancer (H)    -  1       Follow-ups after your visit        Your next 10 appointments already scheduled     Dec 22, 2017 10:45 AM CST   Treatment with [facility]   [facility] ([facility] )    [address] [address]   [phone]            Dec 27, 2017 10:45 AM CST   Treatment with [facility]   [facility] ([facility] )    [address] [address]   [phone]            Dec 27, 2017 11:00 AM CST   on treatment visit with Emerson Costello MD   [facility] ([facility] )    [address] [address]   [phone]            Dec 28, 2017 10:45 AM CST   Treatment with [facility]   [facility] ([facility] )    [address] [address]   [phone]            Dec 29, 2017 10:45 AM CST   Treatment with [facility]   [facility] ([facility] )    [address] [address]   [phone]            Jan 02, 2018 10:45 AM CST   Treatment with [facility]   [facility] ([facility] )    [address] [address]   [phone]            Jan 03, 2018 10:30 AM CST   Treatment with [facility]   [facility] ([facility] )    [address] [address]   [phone]            Jan 03, 2018 11:00 AM CST   on treatment visit with Emerson Costello MD   [facility] ([facility] )    [address] [address]   [phone]            Jan 04, 2018 10:30 AM CST   Treatment with [facility]   [facility] Radiation  "Oncology ([facility] )    [address] [address] [phone]            2018 10:30 AM CST   Treatment with [facility]   [facility] ([facility] )    [address] [address] [phone]              Who to contact     If you have questions or need follow up information about today's clinic visit or your schedule please contact [facility] directly at [phone].  Normal or non-critical lab and imaging results will be communicated to you by MyChart, letter or phone within 4 business days after the clinic has received the results. If you do not hear from us within 7 days, please contact the clinic through olookhart or phone. If you have a critical or abnormal lab result, we will notify you by phone as soon as possible.  Submit refill requests through [employer] or call your pharmacy and they will forward the refill request to us. Please allow 3 business days for your refill to be completed.          Additional Information About Your Visit        [employer] Information     [employer] lets you send messages to your doctor, view your test results, renew your prescriptions, schedule appointments and more. To sign up, go to [email or web address] . Click on \"Log in\" on the left side of the screen, which will take you to the Welcome page. Then click on \"Sign up Now\" on the right side of the page.     You will be asked to enter the access code listed below, as well as some personal information. Please follow the directions to create your username and password.     Your access code is: [email or web address]  Expires: 2018 11:17 AM     Your access code will  in 90 days. If you need help or a new code, please call your [place] clinic or [phone].        Care EveryWhere ID     This is your Care EveryWhere ID. This could be used by other organizations to access your [place] medical records  [id number]         Blood Pressure from " Last 3 Encounters:   12/20/17 124/66   11/27/17 106/58    Weight from Last 3 Encounters:   12/20/17 92.5 kg (204 lb)   11/27/17 91.6 kg (202 lb)              Today, you had the following     No orders found for display       Primary Care Provider    None Specified       No primary provider on file.        Equal Access to Services     CRISTINA [facility] : Hadii aad ku hadfouziao Sofunmiali, waaxda luqadaha, qaybta kaalmada forrestbethanyda, mayito gibson taryntiffany clayton elainemariano sorensen . So [facility] [phone].    ATENCIÓN: Si habla español, tiene a ramirez disposición servicios gratuitos de asistencia lingüística. Llame al [phone].    We comply with applicable federal civil rights laws and [place] laws. We do not discriminate on the basis of race, color, national origin, age, disability, sex, sexual orientation, or gender identity.            Thank you!     Thank you for choosing [facility]  for your care. Our goal is always to provide you with excellent care. Hearing back from our patients is one way we can continue to improve our services. Please take a few minutes to complete the written survey that you may receive in the mail after your visit with us. Thank you!             Your Updated Medication List - Protect others around you: Learn how to safely use, store and throw away your medicines at www.disposemymeds.org.          This list is accurate as of: 12/21/17 11:06 AM.  Always use your most recent med list.                   Brand Name Dispense Instructions for use Diagnosis    atorvastatin 20 MG tablet    LIPITOR     Take 10 mg by mouth        busPIRone 10 MG tablet    BUSPAR     1 1/2 tabs twice daily        enzalutamide 40 MG capsule    XTANDI     Take 80 mg by mouth        gabapentin 300 MG capsule    NEURONTIN     2 tablets morning, 2 afternoon, 3 at bedtime.        HYDROcodone-acetaminophen 5-325 MG per tablet   Start taking on:  1/19/2018    NORCO     ONE  TABLET  TWICE PRN PAIN Limit acetaminophen to 4000 mg per  day from all sources.        insulin glargine 100 UNIT/ML injection    LANTUS     Inject 30 units daily under the skin        ipratropium 17 MCG/ACT Inhaler    ATROVENT HFA     Inhale 2 puffs into the lungs        isosorbide mononitrate 30 MG 24 hr tablet    IMDUR     Take 30 mg by mouth        leuprolide 45 MG kit    LUPRON DEPOT     SHOT EVERY 6 MONTHS        losartan 25 MG tablet    COZAAR     Take 25 mg by mouth        metFORMIN 500 MG tablet    GLUCOPHAGE     Take 500 mg by mouth        nitroGLYcerin 0.4 MG sublingual tablet    NITROSTAT     Place 0.4 mg under the tongue        tamsulosin 0.4 MG capsule    FLOMAX     Take 0.4 mg by mouth        warfarin 5 MG tablet    COUMADIN     2.5 mg daily   7.5 mg M-F  Monitored by irena BOLANOS

## 2017-12-21 NOTE — PROGRESS NOTES
Chelsea Harden received radiation therapy treatment today 12/21/17.    Argentina Xie  December 21, 2017  11:00 AM

## 2017-12-22 ENCOUNTER — ALLIED HEALTH/NURSE VISIT (OUTPATIENT)
Dept: RADIATION ONCOLOGY | Facility: HOSPITAL | Age: 82
End: 2017-12-22

## 2017-12-22 DIAGNOSIS — C61 PROSTATE CANCER (H): Primary | ICD-10-CM

## 2017-12-22 PROCEDURE — 77385 ZZH IMRT TREATMENT DELIVERY, SIMPLE: CPT | Performed by: RADIOLOGY

## 2017-12-22 NOTE — PROGRESS NOTES
Chelsea Harden received radiation therapy treatment today 12/22/17.    Mary Avila  December 22, 2017  10:53 AM

## 2017-12-22 NOTE — MR AVS SNAPSHOT
After Visit Summary   12/22/2017    Chelsea Harden    MRN: [id number]           Patient Information     Date Of Birth          2/22/1934        Visit Information        Provider Department      12/22/2017 10:45 AM [facility]        Today's Diagnoses     Prostate cancer (H)    -  1       Follow-ups after your visit        Your next 10 appointments already scheduled     Dec 27, 2017 10:45 AM CST   Treatment with [facility]   [facility] ([facility] )    [address] [address]   [phone]            Dec 27, 2017 11:00 AM CST   on treatment visit with Emerson Costello MD   [facility] ([facility] )    [address] [address]   [phone]            Dec 28, 2017 10:45 AM CST   Treatment with [facility]   [facility] ([facility] )    [address] [address]   [phone]            Dec 29, 2017 10:45 AM CST   Treatment with [facility]   [facility] Oncology ([facility] )    [address] [address]   [phone]            Jan 02, 2018 10:45 AM CST   Treatment with [facility]   [facility]cology ([facility] )    [address] [address]   [phone]            Jan 03, 2018 10:30 AM CST   Treatment with [facility]   [facility] ([facility] )    [address] [address]   [phone]            Jan 03, 2018 11:00 AM CST   on treatment visit with Emerson Costello MD   [facility]cology ([facility] )    [address] [address]   [phone]            Jan 04, 2018 10:30 AM CST   Treatment with [facility]   [facility]cology ([facility] )    [address] [address]   [phone]            Jan 05, 2018 10:30 AM CST   Treatment with [facility]   HI Radiation  "Oncology ([facility] )    [address] [address] [phone]            2018 10:30 AM CST   Treatment with [facility]   [facility] Radiation Oncology ([facility] )    [address] [address] [phone]              Who to contact     If you have questions or need follow up information about today's clinic visit or your schedule please contact [facility] directly at [phone].  Normal or non-critical lab and imaging results will be communicated to you by MyChart, letter or phone within 4 business days after the clinic has received the results. If you do not hear from us within 7 days, please contact the clinic through [employer] or phone. If you have a critical or abnormal lab result, we will notify you by phone as soon as possible.  Submit refill requests through [employer] or call your pharmacy and they will forward the refill request to us. Please allow 3 business days for your refill to be completed.          Additional Information About Your Visit        [employer] Information     [employer] lets you send messages to your doctor, view your test results, renew your prescriptions, schedule appointments and more. To sign up, go to [email or web address] . Click on \"Log in\" on the left side of the screen, which will take you to the Welcome page. Then click on \"Sign up Now\" on the right side of the page.     You will be asked to enter the access code listed below, as well as some personal information. Please follow the directions to create your username and password.     Your access code is: [email or web address]  Expires: 2018 11:17 AM     Your access code will  in 90 days. If you need help or a new code, please call your [place] clinic or [phone].        Care EveryWhere ID     This is your Care EveryWhere ID. This could be used by other organizations to access your [place] medical records  [id number]         Blood Pressure from " Last 3 Encounters:   12/20/17 124/66   11/27/17 106/58    Weight from Last 3 Encounters:   12/20/17 92.5 kg (204 lb)   11/27/17 91.6 kg (202 lb)              Today, you had the following     No orders found for display       Primary Care Provider    None Specified       No primary provider on file.        Equal Access to Services     CRISTINA [facility] : Hadii aad ku hadfouziao Sofunmiali, waaxda luqadaha, qaybta kaalmada forrestbethanyda, mayito gibson taryntiffany clayton elainemariano sorensen . So [facility] [phone].    ATENCIÓN: Si habla español, tiene a ramirez disposición servicios gratuitos de asistencia lingüística. Llame al [phone].    We comply with applicable federal civil rights laws and [place] laws. We do not discriminate on the basis of race, color, national origin, age, disability, sex, sexual orientation, or gender identity.            Thank you!     Thank you for choosing [facility]  for your care. Our goal is always to provide you with excellent care. Hearing back from our patients is one way we can continue to improve our services. Please take a few minutes to complete the written survey that you may receive in the mail after your visit with us. Thank you!             Your Updated Medication List - Protect others around you: Learn how to safely use, store and throw away your medicines at www.disposemymeds.org.          This list is accurate as of: 12/22/17 10:59 AM.  Always use your most recent med list.                   Brand Name Dispense Instructions for use Diagnosis    atorvastatin 20 MG tablet    LIPITOR     Take 10 mg by mouth        busPIRone 10 MG tablet    BUSPAR     1 1/2 tabs twice daily        enzalutamide 40 MG capsule    XTANDI     Take 80 mg by mouth        gabapentin 300 MG capsule    NEURONTIN     2 tablets morning, 2 afternoon, 3 at bedtime.        HYDROcodone-acetaminophen 5-325 MG per tablet   Start taking on:  1/19/2018    NORCO     ONE  TABLET  TWICE PRN PAIN Limit acetaminophen to 4000 mg per  day from all sources.        insulin glargine 100 UNIT/ML injection    LANTUS     Inject 30 units daily under the skin        ipratropium 17 MCG/ACT Inhaler    ATROVENT HFA     Inhale 2 puffs into the lungs        isosorbide mononitrate 30 MG 24 hr tablet    IMDUR     Take 30 mg by mouth        leuprolide 45 MG kit    LUPRON DEPOT     SHOT EVERY 6 MONTHS        losartan 25 MG tablet    COZAAR     Take 25 mg by mouth        metFORMIN 500 MG tablet    GLUCOPHAGE     Take 500 mg by mouth        nitroGLYcerin 0.4 MG sublingual tablet    NITROSTAT     Place 0.4 mg under the tongue        tamsulosin 0.4 MG capsule    FLOMAX     Take 0.4 mg by mouth        warfarin 5 MG tablet    COUMADIN     2.5 mg daily   7.5 mg M-F  Monitored by irena BOLANOS

## 2017-12-27 ENCOUNTER — OFFICE VISIT (OUTPATIENT)
Dept: RADIATION ONCOLOGY | Facility: HOSPITAL | Age: 82
End: 2017-12-27

## 2017-12-27 ENCOUNTER — ALLIED HEALTH/NURSE VISIT (OUTPATIENT)
Dept: RADIATION ONCOLOGY | Facility: HOSPITAL | Age: 82
End: 2017-12-27

## 2017-12-27 VITALS
HEART RATE: 60 BPM | BODY MASS INDEX: 31.63 KG/M2 | SYSTOLIC BLOOD PRESSURE: 124 MMHG | WEIGHT: 208 LBS | RESPIRATION RATE: 16 BRPM | DIASTOLIC BLOOD PRESSURE: 72 MMHG

## 2017-12-27 DIAGNOSIS — C61 PROSTATE CANCER (H): Primary | ICD-10-CM

## 2017-12-27 PROCEDURE — 77385 ZZH IMRT TREATMENT DELIVERY, SIMPLE: CPT | Performed by: RADIOLOGY

## 2017-12-27 ASSESSMENT — PAIN SCALES - GENERAL: PAINLEVEL: MODERATE PAIN (4)

## 2017-12-27 NOTE — MR AVS SNAPSHOT
After Visit Summary   12/27/2017    Chelsea Harden    MRN: [id number]           Patient Information     Date Of Birth          2/22/1934        Visit Information        Provider Department      12/27/2017 11:00 AM Emerson Costello MD [facility]        Today's Diagnoses     Prostate cancer (H)    -  1       Follow-ups after your visit        Your next 10 appointments already scheduled     Dec 28, 2017 10:45 AM CST   Treatment with [facility]   [facility] Radiation Oncology ([facility] )    [address] [address]   [phone]            Dec 29, 2017 10:45 AM CST   Treatment with [facility]   [facility] Radiation Oncology ([facility] )    [address] [address]   [phone]            Jan 02, 2018 10:45 AM CST   Treatment with [facility]   [facility] Radiation Oncology ([facility] )    [address] [address]   [phone]            Jan 03, 2018 10:30 AM CST   Treatment with [facility]   [facility] Radiation Oncology ([facility] )    [address] [address]   [phone]            Jan 03, 2018 11:00 AM CST   on treatment visit with Emerson Costello MD   [facility]cology ([facility] )    [address] [address]   [phone]            Jan 04, 2018 10:30 AM CST   Treatment with [facility]   [facility] Radiation Oncology ([facility] )    [address] [address]   [phone]            Jan 05, 2018 10:30 AM CST   Treatment with [facility]   [facility] Radiation Oncology ([facility] )    [address] [address]   [phone]            Jan 08, 2018 10:30 AM CST   Treatment with [facility]   [facility] Rad[facility] Oncology ([facility] )    [address] [address]   [phone]            Jan 09, 2018 10:30 AM CST   Treatment with [facility]   [facility] Radiation Oncology  "([facility] )    [address] [address] [phone]            Jamie 10, 2018 10:30 AM CST   Treatment with [facility]   [facility] Radiation Oncology ([facility] )    [address] [address] [phone]              Who to contact     If you have questions or need follow up information about today's clinic visit or your schedule please contact [facility] directly at [phone].  Normal or non-critical lab and imaging results will be communicated to you by [employer], letter or phone within 4 business days after the clinic has received the results. If you do not hear from us within 7 days, please contact the clinic through [employer] or phone. If you have a critical or abnormal lab result, we will notify you by phone as soon as possible.  Submit refill requests through [email or web address] or call your pharmacy and they will forward the refill request to us. Please allow 3 business days for your refill to be completed.          Additional Information About Your Visit        [email or web address] Information     [email or web address] lets you send messages to your doctor, view your test results, renew your prescriptions, schedule appointments and more. To sign up, go to [email or web address] . Click on \"Log in\" on the left side of the screen, which will take you to the Welcome page. Then click on \"Sign up Now\" on the right side of the page.     You will be asked to enter the access code listed below, as well as some personal information. Please follow the directions to create your username and password.     Your access code is: [email or web address]  Expires: 2018 11:17 AM     Your access code will  in 90 days. If you need help or a new code, please call your [place] clinic or [phone].        Care EveryWhere ID     This is your Care EveryWhere ID. This could be used by other organizations to access your [place] medical records  [id number]        Your Vitals Were     Pulse " Respirations BMI (Body Mass Index)             60 16 31.63 kg/m2          Blood Pressure from Last 3 Encounters:   12/27/17 124/72   12/20/17 124/66   11/27/17 106/58    Weight from Last 3 Encounters:   12/27/17 94.3 kg (208 lb)   12/20/17 92.5 kg (204 lb)   11/27/17 91.6 kg (202 lb)              Today, you had the following     No orders found for display       Primary Care Provider    None Specified       No primary provider on file.        Equal Access to Services     CRISTINA ZAMARRIPA : Hadii chio arguetao Solazarus, waaxda luqadaha, qaybta kaalmada shravan, mayito sorensen . So [facility] [phone].    ATENCIÓN: Si habla español, tiene a ramirez disposición servicios gratuitos de asistencia lingüística. Llame al [phone].    We comply with applicable federal civil rights laws and [place] laws. We do not discriminate on the basis of race, color, national origin, age, disability, sex, sexual orientation, or gender identity.            Thank you!     Thank you for choosing [facility]  for your care. Our goal is always to provide you with excellent care. Hearing back from our patients is one way we can continue to improve our services. Please take a few minutes to complete the written survey that you may receive in the mail after your visit with us. Thank you!             Your Updated Medication List - Protect others around you: Learn how to safely use, store and throw away your medicines at www.disposemymeds.org.          This list is accurate as of: 12/27/17  3:54 PM.  Always use your most recent med list.                   Brand Name Dispense Instructions for use Diagnosis    atorvastatin 20 MG tablet    LIPITOR     Take 10 mg by mouth        busPIRone 10 MG tablet    BUSPAR     1 1/2 tabs twice daily        enzalutamide 40 MG capsule    XTANDI     Take 80 mg by mouth        gabapentin 300 MG capsule    NEURONTIN     2 tablets morning, 2 afternoon, 3 at bedtime.         HYDROcodone-acetaminophen 5-325 MG per tablet   Start taking on:  1/19/2018    NORCO     ONE  TABLET  TWICE PRN PAIN Limit acetaminophen to 4000 mg per day from all sources.        insulin glargine 100 UNIT/ML injection    LANTUS     Inject 30 units daily under the skin        ipratropium 17 MCG/ACT Inhaler    ATROVENT HFA     Inhale 2 puffs into the lungs        isosorbide mononitrate 30 MG 24 hr tablet    IMDUR     Take 30 mg by mouth        leuprolide 45 MG kit    LUPRON DEPOT     SHOT EVERY 6 MONTHS        losartan 25 MG tablet    COZAAR     Take 25 mg by mouth        metFORMIN 500 MG tablet    GLUCOPHAGE     Take 500 mg by mouth        nitroGLYcerin 0.4 MG sublingual tablet    NITROSTAT     Place 0.4 mg under the tongue        tamsulosin 0.4 MG capsule    FLOMAX     Take 0.4 mg by mouth        warfarin 5 MG tablet    COUMADIN     2.5 mg daily   7.5 mg M-F  Monitored by irena BOLANOS

## 2017-12-27 NOTE — PROGRESS NOTES
Chelsea Harden received radiation therapy treatment today 12/27/17.    Josias Pizano  December 27, 2017  11:19 AM

## 2017-12-27 NOTE — MR AVS SNAPSHOT
After Visit Summary   12/27/2017    Chelsea Harden    MRN: [id number]           Patient Information     Date Of Birth          2/22/1934        Visit Information        Provider Department      12/27/2017 10:45 AM [facility]        Today's Diagnoses     Prostate cancer (H)    -  1       Follow-ups after your visit        Your next 10 appointments already scheduled     Dec 28, 2017 10:45 AM CST   Treatment with [facility]   [facility] Radiation Oncology ([facility] )    [address] [address]   [phone]            Dec 29, 2017 10:45 AM CST   Treatment with [facility] IX   [facility] Radiation Oncology ([facility] )    [address] [address]   [phone]            Jan 02, 2018 10:45 AM CST   Treatment with [facility]   [facility] Radiation Oncology ([facility] )    [address] [address]   [phone]            Jan 03, 2018 10:30 AM CST   Treatment with [facility]   [facility] Radiation Oncology ([facility] )    [address] [address]   [phone]            Jan 03, 2018 11:00 AM CST   on treatment visit with Emerson Costello MD   [facility] Radiation Oncology ([facility] )    [address] [address]   [phone]            Jan 04, 2018 10:30 AM CST   Treatment with [facility]   [facility] Radiation Oncology ([facility] )    [address] [address]   [phone]            Jan 05, 2018 10:30 AM CST   Treatment with [facility] IX   [facility] Radiation Oncology ([facility] )    [address] [address]   [phone]            Jan 08, 2018 10:30 AM CST   Treatment with [facility]   [facility] Radiation Oncology ([facility] )    [address] [address]   [phone]            Jan 09, 2018 10:30 AM CST   Treatment with [facility] CL[facility] IX   [facility] Radiation Oncology ([place]  [facility] )    [address] [id number] [phone]            Jamie 10, 2018 10:30 AM CST   Treatment with HI CL[facility] IX   [facility] Radiation Oncology ([facility] )    [address] [id number] [phone]              Who to contact     If you have questions or need follow up information about today's clinic visit or your schedule please contact [facility] directly at [phone].  Normal or non-critical lab and imaging results will be communicated to you by MyChart, letter or phone within 4 business days after the clinic has received the results. If you do not hear from us within 7 days, please contact the clinic through [employer] or phone. If you have a critical or abnormal lab result, we will notify you by phone as soon as possible.  Submit refill requests through [employer] or call your pharmacy and they will forward the refill request to us. Please allow 3 business days for your refill to be completed.          Additional Information About Your Visit        [employer] Information     [employer] lets you send messages to your doctor, view your test results, renew your prescriptions, schedule appointments and more. To sign up, go to [email or web address] . Click on \"Log in\" on the left side of the screen, which will take you to the Welcome page. Then click on \"Sign up Now\" on the right side of the page.     You will be asked to enter the access code listed below, as well as some personal information. Please follow the directions to create your username and password.     Your access code is: [email or web address]  Expires: 2018 11:17 AM     Your access code will  in 90 days. If you need help or a new code, please call your [place] clinic or [phone].        Care EveryWhere ID     This is your Care EveryWhere ID. This could be used by other organizations to access your [place] medical records  [id number]         Blood Pressure from Last 3 " Encounters:   12/20/17 124/66   11/27/17 106/58    Weight from Last 3 Encounters:   12/20/17 92.5 kg (204 lb)   11/27/17 91.6 kg (202 lb)              Today, you had the following     No orders found for display       Primary Care Provider    None Specified       No primary provider on file.        Equal Access to Services     [facility] : Hadii chio ku hadfouziao Sofunmiali, waaxda luqadaha, qaybta kaalmada shravan, mayito gibson haydyllan rubinchancemariano sorensen . So [facility] [phone].    ATENCIÓN: Si habla español, tiene a ramirez disposición servicios gratuitos de asistencia lingüística. Khalidaame al [phone].    We comply with applicable federal civil rights laws and [place] laws. We do not discriminate on the basis of race, color, national origin, age, disability, sex, sexual orientation, or gender identity.            Thank you!     Thank you for choosing [facility]  for your care. Our goal is always to provide you with excellent care. Hearing back from our patients is one way we can continue to improve our services. Please take a few minutes to complete the written survey that you may receive in the mail after your visit with us. Thank you!             Your Updated Medication List - Protect others around you: Learn how to safely use, store and throw away your medicines at www.disposemymeds.org.          This list is accurate as of: 12/27/17 11:20 AM.  Always use your most recent med list.                   Brand Name Dispense Instructions for use Diagnosis    atorvastatin 20 MG tablet    LIPITOR     Take 10 mg by mouth        busPIRone 10 MG tablet    BUSPAR     1 1/2 tabs twice daily        enzalutamide 40 MG capsule    XTANDI     Take 80 mg by mouth        gabapentin 300 MG capsule    NEURONTIN     2 tablets morning, 2 afternoon, 3 at bedtime.        HYDROcodone-acetaminophen 5-325 MG per tablet   Start taking on:  1/19/2018    NORCO     ONE  TABLET  TWICE PRN PAIN Limit acetaminophen to 4000 mg per day  from all sources.        insulin glargine 100 UNIT/ML injection    LANTUS     Inject 30 units daily under the skin        ipratropium 17 MCG/ACT Inhaler    ATROVENT HFA     Inhale 2 puffs into the lungs        isosorbide mononitrate 30 MG 24 hr tablet    IMDUR     Take 30 mg by mouth        leuprolide 45 MG kit    LUPRON DEPOT     SHOT EVERY 6 MONTHS        losartan 25 MG tablet    COZAAR     Take 25 mg by mouth        metFORMIN 500 MG tablet    GLUCOPHAGE     Take 500 mg by mouth        nitroGLYcerin 0.4 MG sublingual tablet    NITROSTAT     Place 0.4 mg under the tongue        tamsulosin 0.4 MG capsule    FLOMAX     Take 0.4 mg by mouth        warfarin 5 MG tablet    COUMADIN     2.5 mg daily   7.5 mg M-F  Monitored by irena BOLANOS

## 2017-12-27 NOTE — PROGRESS NOTES
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      RADIATION RX:  John Mcmanus has received 1520 cGy to date for potential local management of the above described prostate carcinoma.      SUBJECTIVE:  Still doing fairly well, not much in the way of bowel or bladder symptomatology.  Nocturia generally 1-2 times per night.  Stool maybe a little bit softer but no kim diarrhea.      OBJECTIVE:  Weight 208 pounds.  Abdomen is benign.  Bowel sounds present.      IMPRESSION:  Routine tolerance to radiation therapy for prostate carcinoma.      PLAN:  Continue treatment as planned.         DAJA BERNARDO MD             D: 2017 11:44   T: 2017 12:02   MT: CD      Name:     JOHN MCMANUS   MRN:      [id number]        Account:      [id number]   :      1934           Service Date: 2017      Document: [id number]       cc: Singh Oglesby MD

## 2017-12-28 ENCOUNTER — ALLIED HEALTH/NURSE VISIT (OUTPATIENT)
Dept: RADIATION ONCOLOGY | Facility: HOSPITAL | Age: 82
End: 2017-12-28

## 2017-12-28 DIAGNOSIS — C61 PROSTATE CANCER (H): Primary | ICD-10-CM

## 2017-12-28 PROCEDURE — 77385 ZZH IMRT TREATMENT DELIVERY, SIMPLE: CPT | Performed by: RADIOLOGY

## 2017-12-28 NOTE — MR AVS SNAPSHOT
After Visit Summary   12/28/2017    Chelsea Harden    MRN: [id number]           Patient Information     Date Of Birth          2/22/1934        Visit Information        Provider Department      12/28/2017 10:45 AM [facility]        Today's Diagnoses     Prostate cancer (H)    -  1       Follow-ups after your visit        Your next 10 appointments already scheduled     Dec 29, 2017 10:45 AM CST   Treatment with [facility]   [facility] Radiation Oncology ([facility] )    [address] [address]   [phone]            Jan 02, 2018 10:45 AM CST   Treatment with [facility]   [facility] Radiation Oncology ([facility] )    [address] [address]   [phone]            Jan 03, 2018 10:30 AM CST   Treatment with [facility]   [facility] Radiation Oncology ([facility] )    [address] [address]   [phone]            Jan 03, 2018 11:00 AM CST   on treatment visit with Emerson Costello MD   [facility] Radiation Oncology ([facility] )    [address] [address]   [phone]            Jan 04, 2018 10:30 AM CST   Treatment with [facility]   [facility] Radiation Oncology ([facility] )    [address] [address]   [phone]            Jan 05, 2018 10:30 AM CST   Treatment with [facility]   [facility] Radiation Oncology ([facility] )    [address] [address]   [phone]            Jan 08, 2018 10:30 AM CST   Treatment with [facility]   [facility] Radiation Oncology ([facility] )    [address] [address]   [phone]            Jan 09, 2018 10:30 AM CST   Treatment with [facility]   [facility] Radiation Oncology ([facility] )    [address] [address]   [phone]            Jamie 10, 2018 10:30 AM CST   Treatment with [facility]   [facility] Radiation Oncology ([place]  [facility] )    [address] [address] [phone]            Jamie 10, 2018 11:00 AM CST   on treatment visit with Emerson Costello MD   [facility] Radiation Oncology ([facility] )    [address] [address] [phone]              Who to contact     If you have questions or need follow up information about today's clinic visit or your schedule please contact [facility] directly at [phone].  Normal or non-critical lab and imaging results will be communicated to you by MyChart, letter or phone within 4 business days after the clinic has received the results. If you do not hear from us within 7 days, please contact the clinic through [employer] or phone. If you have a critical or abnormal lab result, we will notify you by phone as soon as possible.  Submit refill requests through SupportLocal or call your pharmacy and they will forward the refill request to us. Please allow 3 business days for your refill to be completed.          Additional Information About Your Visit        [employer] Information     SupportLocal lets you send messages to your doctor, view your test results, renew your prescriptions, schedule appointments and more. To sign up, go to [email or web address] . Click on \"Log in\" on the left side of the screen, which will take you to the Welcome page. Then click on \"Sign up Now\" on the right side of the page.     You will be asked to enter the access code listed below, as well as some personal information. Please follow the directions to create your username and password.     Your access code is: [email or web address]  Expires: 2018 11:17 AM     Your access code will  in 90 days. If you need help or a new code, please call your [place] clinic or [phone].        Care EveryWhere ID     This is your Care EveryWhere ID. This could be used by other organizations to access your [place] medical records  [id number]         Blood Pressure from " Last 3 Encounters:   12/27/17 124/72   12/20/17 124/66   11/27/17 106/58    Weight from Last 3 Encounters:   12/27/17 94.3 kg (208 lb)   12/20/17 92.5 kg (204 lb)   11/27/17 91.6 kg (202 lb)              Today, you had the following     No orders found for display       Primary Care Provider    None Specified       No primary provider on file.        Equal Access to Services     CRISTINA ZAMARRIPA : Hadii chio rome Solazarus, wasarahda lufletcheradaha, qaybta kaalmada adejavierda, mayito gibson haydyllan rubinchancemariano sorensen . So [facility] [phone].    ATENCIÓN: Si habla esptommy, tiene a ramirez disposición servicios gratuitos de asistencia lingüística. Llame al [phone].    We comply with applicable federal civil rights laws and [place] laws. We do not discriminate on the basis of race, color, national origin, age, disability, sex, sexual orientation, or gender identity.            Thank you!     Thank you for choosing [facility]  for your care. Our goal is always to provide you with excellent care. Hearing back from our patients is one way we can continue to improve our services. Please take a few minutes to complete the written survey that you may receive in the mail after your visit with us. Thank you!             Your Updated Medication List - Protect others around you: Learn how to safely use, store and throw away your medicines at www.disposemymeds.org.          This list is accurate as of: 12/28/17 11:11 AM.  Always use your most recent med list.                   Brand Name Dispense Instructions for use Diagnosis    atorvastatin 20 MG tablet    LIPITOR     Take 10 mg by mouth        busPIRone 10 MG tablet    BUSPAR     1 1/2 tabs twice daily        enzalutamide 40 MG capsule    XTANDI     Take 80 mg by mouth        gabapentin 300 MG capsule    NEURONTIN     2 tablets morning, 2 afternoon, 3 at bedtime.        HYDROcodone-acetaminophen 5-325 MG per tablet   Start taking on:  1/19/2018    NORCO     ONE  TABLET   TWICE PRN PAIN Limit acetaminophen to 4000 mg per day from all sources.        insulin glargine 100 UNIT/ML injection    LANTUS     Inject 30 units daily under the skin        ipratropium 17 MCG/ACT Inhaler    ATROVENT HFA     Inhale 2 puffs into the lungs        isosorbide mononitrate 30 MG 24 hr tablet    IMDUR     Take 30 mg by mouth        leuprolide 45 MG kit    LUPRON DEPOT     SHOT EVERY 6 MONTHS        losartan 25 MG tablet    COZAAR     Take 25 mg by mouth        metFORMIN 500 MG tablet    GLUCOPHAGE     Take 500 mg by mouth        nitroGLYcerin 0.4 MG sublingual tablet    NITROSTAT     Place 0.4 mg under the tongue        tamsulosin 0.4 MG capsule    FLOMAX     Take 0.4 mg by mouth        warfarin 5 MG tablet    COUMADIN     2.5 mg daily   7.5 mg M-F  Monitored by irena BOLANOS

## 2017-12-28 NOTE — PROGRESS NOTES
Chelsea Harden received radiation therapy treatment today 12/28/17.    Josias Pizano  December 28, 2017  11:03 AM

## 2017-12-29 ENCOUNTER — ALLIED HEALTH/NURSE VISIT (OUTPATIENT)
Dept: RADIATION ONCOLOGY | Facility: HOSPITAL | Age: 82
End: 2017-12-29

## 2017-12-29 DIAGNOSIS — C61 PROSTATE CANCER (H): Primary | ICD-10-CM

## 2017-12-29 PROCEDURE — 77385 ZZH IMRT TREATMENT DELIVERY, SIMPLE: CPT | Performed by: RADIOLOGY

## 2017-12-29 PROCEDURE — 77336 RADIATION PHYSICS CONSULT: CPT | Performed by: RADIOLOGY

## 2017-12-29 NOTE — PROGRESS NOTES
Chelsea Harden received radiation therapy treatment today 12/29/17.    Josias Pizano  December 29, 2017  11:31 AM

## 2017-12-29 NOTE — MR AVS SNAPSHOT
After Visit Summary   12/29/2017    Chelsea Harden    MRN: [id number]           Patient Information     Date Of Birth          2/22/1934        Visit Information        Provider Department      12/29/2017 10:45 AM [facility]        Today's Diagnoses     Prostate cancer (H)    -  1       Follow-ups after your visit        Your next 10 appointments already scheduled     Jan 02, 2018 10:45 AM CST   Treatment with [facility]   [facility] Radiation Oncology ([facility] )    [address] [address]   [phone]            Jan 03, 2018 10:30 AM CST   Treatment with [facility]   [facility] Radiation Oncology ([facility] )    [address] [address]   [phone]            Jan 03, 2018 11:00 AM CST   on treatment visit with Emerson Costello MD   [facility] Radiation Oncology ([facility] )    [address] [address]   [phone]            Jan 04, 2018 10:30 AM CST   Treatment with [facility]   [facility] Radiation Oncology ([facility] )    [address] [address]   [phone]            Jan 05, 2018 10:30 AM CST   Treatment with [facility]   [facility] Radiation Oncology ([facility] )    [address] [address]   [phone]            Jan 08, 2018 10:30 AM CST   Treatment with [facility]   [facility] Radiation Oncology ([facility] )    [address] [address]   [phone]            Jan 09, 2018 10:30 AM CST   Treatment with [facility]   [facility] Radiation Oncology ([facility] )    [address] [address]   [phone]            Jamie 10, 2018 10:30 AM CST   Treatment with [facility]   [facility] Radiation Oncology ([facility] )    [address] [address]   [phone]            Jamie 10, 2018 11:00 AM CST   on treatment visit with Emerson Costello MD   HI Radiation  "Oncology ([facility] )    [address] [address] [phone]            2018 10:30 AM CST   Treatment with [facility]   [facility] Radiation Oncology ([facility] )    [address] [address] [phone]              Who to contact     If you have questions or need follow up information about today's clinic visit or your schedule please contact [facility] directly at [phone].  Normal or non-critical lab and imaging results will be communicated to you by MyChart, letter or phone within 4 business days after the clinic has received the results. If you do not hear from us within 7 days, please contact the clinic through [employer] or phone. If you have a critical or abnormal lab result, we will notify you by phone as soon as possible.  Submit refill requests through [employer] or call your pharmacy and they will forward the refill request to us. Please allow 3 business days for your refill to be completed.          Additional Information About Your Visit        [employer] Information     [employer] lets you send messages to your doctor, view your test results, renew your prescriptions, schedule appointments and more. To sign up, go to [email or web address] . Click on \"Log in\" on the left side of the screen, which will take you to the Welcome page. Then click on \"Sign up Now\" on the right side of the page.     You will be asked to enter the access code listed below, as well as some personal information. Please follow the directions to create your username and password.     Your access code is: [email or web address]  Expires: 2018 11:17 AM     Your access code will  in 90 days. If you need help or a new code, please call your [place] clinic or [phone].        Care EveryWhere ID     This is your Care EveryWhere ID. This could be used by other organizations to access your [place] medical records  [id number]         Blood Pressure from " Last 3 Encounters:   12/27/17 124/72   12/20/17 124/66   11/27/17 106/58    Weight from Last 3 Encounters:   12/27/17 94.3 kg (208 lb)   12/20/17 92.5 kg (204 lb)   11/27/17 91.6 kg (202 lb)              Today, you had the following     No orders found for display       Primary Care Provider    None Specified       No primary provider on file.        Equal Access to Services     CRISTINA ZAMARRIPA : Hadii chio rome Solazarus, wasarahda lufletcheradaha, qaybta kaalmada adejavierda, mayito gibson haydyllan rubinchancemariano sorensen . So [facility] [phone].    ATENCIÓN: Si habla esptommy, tiene a ramirez disposición servicios gratuitos de asistencia lingüística. Llame al [phone].    We comply with applicable federal civil rights laws and [place] laws. We do not discriminate on the basis of race, color, national origin, age, disability, sex, sexual orientation, or gender identity.            Thank you!     Thank you for choosing [facility]  for your care. Our goal is always to provide you with excellent care. Hearing back from our patients is one way we can continue to improve our services. Please take a few minutes to complete the written survey that you may receive in the mail after your visit with us. Thank you!             Your Updated Medication List - Protect others around you: Learn how to safely use, store and throw away your medicines at www.disposemymeds.org.          This list is accurate as of: 12/29/17 11:31 AM.  Always use your most recent med list.                   Brand Name Dispense Instructions for use Diagnosis    atorvastatin 20 MG tablet    LIPITOR     Take 10 mg by mouth        busPIRone 10 MG tablet    BUSPAR     1 1/2 tabs twice daily        enzalutamide 40 MG capsule    XTANDI     Take 80 mg by mouth        gabapentin 300 MG capsule    NEURONTIN     2 tablets morning, 2 afternoon, 3 at bedtime.        HYDROcodone-acetaminophen 5-325 MG per tablet   Start taking on:  1/19/2018    NORCO     ONE  TABLET   TWICE PRN PAIN Limit acetaminophen to 4000 mg per day from all sources.        insulin glargine 100 UNIT/ML injection    LANTUS     Inject 30 units daily under the skin        ipratropium 17 MCG/ACT Inhaler    ATROVENT HFA     Inhale 2 puffs into the lungs        isosorbide mononitrate 30 MG 24 hr tablet    IMDUR     Take 30 mg by mouth        leuprolide 45 MG kit    LUPRON DEPOT     SHOT EVERY 6 MONTHS        losartan 25 MG tablet    COZAAR     Take 25 mg by mouth        metFORMIN 500 MG tablet    GLUCOPHAGE     Take 500 mg by mouth        nitroGLYcerin 0.4 MG sublingual tablet    NITROSTAT     Place 0.4 mg under the tongue        tamsulosin 0.4 MG capsule    FLOMAX     Take 0.4 mg by mouth        warfarin 5 MG tablet    COUMADIN     2.5 mg daily   7.5 mg M-F  Monitored by irena BOLANOS

## 2018-01-02 ENCOUNTER — ALLIED HEALTH/NURSE VISIT (OUTPATIENT)
Dept: RADIATION ONCOLOGY | Facility: HOSPITAL | Age: 83
End: 2018-01-02
Attending: RADIOLOGY
Payer: MEDICARE

## 2018-01-02 DIAGNOSIS — C61 PROSTATE CANCER (H): Primary | ICD-10-CM

## 2018-01-02 PROCEDURE — 77385 ZZH IMRT TREATMENT DELIVERY, SIMPLE: CPT | Performed by: RADIOLOGY

## 2018-01-02 NOTE — PROGRESS NOTES
Chelsea Harden received radiation therapy treatment today 01/02/18.    Argentina Xie  January 2, 2018  11:08 AM

## 2018-01-02 NOTE — MR AVS SNAPSHOT
After Visit Summary   1/2/2018    Chelsea Harden    MRN: [id number]           Patient Information     Date Of Birth          2/22/1934        Visit Information        Provider Department      1/2/2018 10:45 AM [facility]        Today's Diagnoses     Prostate cancer (H)    -  1       Follow-ups after your visit        Your next 10 appointments already scheduled     Jan 03, 2018 10:30 AM CST   Treatment with [facility]   [facility] Radiation Oncology ([facility] )    [address] [address]   [phone]            Jan 03, 2018 10:45 AM CST   on treatment visit with Emerson Costello MD   [facility] Radiation Oncology ([facility] )    [address] [address]   [phone]            Jan 04, 2018 10:30 AM CST   Treatment with [facility]   [facility] Radiation Oncology ([facility] )    [address] [address]   [phone]            Jan 05, 2018 10:30 AM CST   Treatment with [facility]   [facility] Radiation Oncology ([facility] )    [address] [address]   [phone]            Jan 08, 2018 10:30 AM CST   Treatment with [facility]   [facility] Radiation Oncology ([facility] )    [address] [address]   [phone]            Jan 09, 2018 10:30 AM CST   Treatment with [facility]   [facility] Radiation Oncology ([facility] )    [address] [address]   [phone]            Jamie 10, 2018 10:30 AM CST   Treatment with [facility]   [facility] Radiation Oncology ([facility] )    [address] [address]   [phone]            Jamie 10, 2018 11:00 AM CST   on treatment visit with Emerson Costello MD   [facility] Radiation Oncology ([facility] )    [address] [address]   [phone]            Jan 11, 2018 10:30 AM CST   Treatment with [facility]   HI Radiation  "Oncology ([facility] )    [address] [address] [phone]            2018 10:30 AM CST   Treatment with [facility]   [facility] Radiation Oncology ([facility] )    [address] [address] [phone]              Who to contact     If you have questions or need follow up information about today's clinic visit or your schedule please contact [facility] directly at [phone].  Normal or non-critical lab and imaging results will be communicated to you by MyChart, letter or phone within 4 business days after the clinic has received the results. If you do not hear from us within 7 days, please contact the clinic through [employer] or phone. If you have a critical or abnormal lab result, we will notify you by phone as soon as possible.  Submit refill requests through [employer] or call your pharmacy and they will forward the refill request to us. Please allow 3 business days for your refill to be completed.          Additional Information About Your Visit        [employer] Information     [employer] lets you send messages to your doctor, view your test results, renew your prescriptions, schedule appointments and more. To sign up, go to [email or web address] . Click on \"Log in\" on the left side of the screen, which will take you to the Welcome page. Then click on \"Sign up Now\" on the right side of the page.     You will be asked to enter the access code listed below, as well as some personal information. Please follow the directions to create your username and password.     Your access code is: [email or web address]  Expires: 2018 11:17 AM     Your access code will  in 90 days. If you need help or a new code, please call your [place] clinic or [phone].        Care EveryWhere ID     This is your Care EveryWhere ID. This could be used by other organizations to access your [place] medical records  [id number]         Blood Pressure from " Last 3 Encounters:   12/27/17 124/72   12/20/17 124/66   11/27/17 106/58    Weight from Last 3 Encounters:   12/27/17 94.3 kg (208 lb)   12/20/17 92.5 kg (204 lb)   11/27/17 91.6 kg (202 lb)              Today, you had the following     No orders found for display       Primary Care Provider    None Specified       No primary provider on file.        Equal Access to Services     CRISTINA ZAMARRIPA : Hadii chio rome Solazarus, wasarahda lufletcheradaha, qaybta kaalmada fletcherda, mayito rubinchancemariano sorensen . So [facility] [phone].    ATENCIÓN: Si habla esptommy, tiene a ramirez disposición servicios gratuitos de asistencia lingüística. Llame al [phone].    We comply with applicable federal civil rights laws and [place] laws. We do not discriminate on the basis of race, color, national origin, age, disability, sex, sexual orientation, or gender identity.            Thank you!     Thank you for choosing [facility]  for your care. Our goal is always to provide you with excellent care. Hearing back from our patients is one way we can continue to improve our services. Please take a few minutes to complete the written survey that you may receive in the mail after your visit with us. Thank you!             Your Updated Medication List - Protect others around you: Learn how to safely use, store and throw away your medicines at www.disposemymeds.org.          This list is accurate as of: 1/2/18 11:08 AM.  Always use your most recent med list.                   Brand Name Dispense Instructions for use Diagnosis    atorvastatin 20 MG tablet    LIPITOR     Take 10 mg by mouth        busPIRone 10 MG tablet    BUSPAR     1 1/2 tabs twice daily        enzalutamide 40 MG capsule    XTANDI     Take 80 mg by mouth        gabapentin 300 MG capsule    NEURONTIN     2 tablets morning, 2 afternoon, 3 at bedtime.        HYDROcodone-acetaminophen 5-325 MG per tablet   Start taking on:  1/19/2018    NORCO     ONE  TABLET  TWICE  PRN PAIN Limit acetaminophen to 4000 mg per day from all sources.        insulin glargine 100 UNIT/ML injection    LANTUS     Inject 30 units daily under the skin        ipratropium 17 MCG/ACT Inhaler    ATROVENT HFA     Inhale 2 puffs into the lungs        isosorbide mononitrate 30 MG 24 hr tablet    IMDUR     Take 30 mg by mouth        leuprolide 45 MG kit    LUPRON DEPOT     SHOT EVERY 6 MONTHS        losartan 25 MG tablet    COZAAR     Take 25 mg by mouth        metFORMIN 500 MG tablet    GLUCOPHAGE     Take 500 mg by mouth        nitroGLYcerin 0.4 MG sublingual tablet    NITROSTAT     Place 0.4 mg under the tongue        tamsulosin 0.4 MG capsule    FLOMAX     Take 0.4 mg by mouth        warfarin 5 MG tablet    COUMADIN     2.5 mg daily   7.5 mg M-F  Monitored by irena BOLANOS

## 2018-01-03 ENCOUNTER — ALLIED HEALTH/NURSE VISIT (OUTPATIENT)
Dept: RADIATION ONCOLOGY | Facility: HOSPITAL | Age: 83
End: 2018-01-03

## 2018-01-03 ENCOUNTER — OFFICE VISIT (OUTPATIENT)
Dept: RADIATION ONCOLOGY | Facility: HOSPITAL | Age: 83
End: 2018-01-03
Attending: RADIOLOGY
Payer: MEDICARE

## 2018-01-03 VITALS
HEART RATE: 60 BPM | SYSTOLIC BLOOD PRESSURE: 108 MMHG | BODY MASS INDEX: 31.32 KG/M2 | DIASTOLIC BLOOD PRESSURE: 64 MMHG | RESPIRATION RATE: 16 BRPM | WEIGHT: 206 LBS

## 2018-01-03 DIAGNOSIS — C61 PROSTATE CANCER (H): Primary | ICD-10-CM

## 2018-01-03 PROCEDURE — 77385 ZZH IMRT TREATMENT DELIVERY, SIMPLE: CPT | Performed by: RADIOLOGY

## 2018-01-03 ASSESSMENT — PAIN SCALES - GENERAL: PAINLEVEL: MODERATE PAIN (4)

## 2018-01-03 NOTE — PROGRESS NOTES
Chelsea Harden received radiation therapy treatment today 01/03/18.    Rochelle Ortiz  January 3, 2018  10:36 AM

## 2018-01-03 NOTE — MR AVS SNAPSHOT
After Visit Summary   1/3/2018    Chelsea Harden    MRN: [id number]           Patient Information     Date Of Birth          2/22/1934        Visit Information        Provider Department      1/3/2018 10:30 AM [facility]        Today's Diagnoses     Prostate cancer (H)    -  1       Follow-ups after your visit        Your next 10 appointments already scheduled     Jan 04, 2018 10:30 AM CST   Treatment with [facility]   [facility] ([facility] )    [address] [address]   [phone]            Jan 05, 2018 10:30 AM CST   Treatment with [facility]   [facility] ([facility] )    [address] [address]   [phone]            Jan 08, 2018 10:30 AM CST   Treatment with [facility]   [facility] ([facility] )    [address] [address]   [phone]            Jan 09, 2018 10:30 AM CST   Treatment with [facility]   [facility] ([facility] )    [address] [address]   [phone]            Jamie 10, 2018 10:30 AM CST   Treatment with [facility]   [facility] ([facility] )    [address] [address]   [phone]            Jamie 10, 2018 11:00 AM CST   on treatment visit with Emerson Costello MD   [facility] ([facility] )    [address] [address]   [phone]            Jan 11, 2018 10:30 AM CST   Treatment with [facility]   [facility] ([facility] )    [address] [address]   [phone]            Jan 12, 2018 10:30 AM CST   Treatment with [facility]   [facility] ([facility] )    [address] [address]   [phone]            Jamie 15, 2018 10:30 AM CST   Treatment with [facility]   [facility] Radiation Oncology ([facility]  "[facility] )    [address] [address] [phone]            2018 10:30 AM CST   Treatment with [facility]   [facility] Radiation Oncology ([facility] )    [address] [address] [phone]              Who to contact     If you have questions or need follow up information about today's clinic visit or your schedule please contact [facility] directly at [phone].  Normal or non-critical lab and imaging results will be communicated to you by FusionOnehart, letter or phone within 4 business days after the clinic has received the results. If you do not hear from us within 7 days, please contact the clinic through FusionOnehart or phone. If you have a critical or abnormal lab result, we will notify you by phone as soon as possible.  Submit refill requests through [employer] or call your pharmacy and they will forward the refill request to us. Please allow 3 business days for your refill to be completed.          Additional Information About Your Visit        Live[employer] Information     [employer] lets you send messages to your doctor, view your test results, renew your prescriptions, schedule appointments and more. To sign up, go to [email or web address] . Click on \"Log in\" on the left side of the screen, which will take you to the Welcome page. Then click on \"Sign up Now\" on the right side of the page.     You will be asked to enter the access code listed below, as well as some personal information. Please follow the directions to create your username and password.     Your access code is: [email or web address]  Expires: 2018 11:17 AM     Your access code will  in 90 days. If you need help or a new code, please call your [place] clinic or [phone].        Care EveryWhere ID     This is your Care EveryWhere ID. This could be used by other organizations to access your [place] medical records  [id number]         Blood Pressure from Last 3 Encounters: "   01/03/18 108/64   12/27/17 124/72   12/20/17 124/66    Weight from Last 3 Encounters:   01/03/18 93.4 kg (206 lb)   12/27/17 94.3 kg (208 lb)   12/20/17 92.5 kg (204 lb)              Today, you had the following     No orders found for display       Primary Care Provider    None Specified       No primary provider on file.        Equal Access to Services     EZEQUIEL ZAMARRIPA : Hadii chio ku ellieo Sofunmiali, waaxda luqadaha, qaybta kaalmada adeegyada, mayito gibson taryntiffany rubinchancemariano sorensen . So [facility] [phone].    ATENCIÓN: Si habla raul, tiene a ramirez disposición servicios gratuitos de asistencia lingüística. Llame al [phone].    We comply with applicable federal civil rights laws and [place] laws. We do not discriminate on the basis of race, color, national origin, age, disability, sex, sexual orientation, or gender identity.            Thank you!     Thank you for choosing [facility]  for your care. Our goal is always to provide you with excellent care. Hearing back from our patients is one way we can continue to improve our services. Please take a few minutes to complete the written survey that you may receive in the mail after your visit with us. Thank you!             Your Updated Medication List - Protect others around you: Learn how to safely use, store and throw away your medicines at www.disposemymeds.org.          This list is accurate as of: 1/3/18 11:30 AM.  Always use your most recent med list.                   Brand Name Dispense Instructions for use Diagnosis    atorvastatin 20 MG tablet    LIPITOR     Take 10 mg by mouth        busPIRone 10 MG tablet    BUSPAR     1 1/2 tabs twice daily        enzalutamide 40 MG capsule    XTANDI     Take 80 mg by mouth        gabapentin 300 MG capsule    NEURONTIN     2 tablets morning, 2 afternoon, 3 at bedtime.        HYDROcodone-acetaminophen 5-325 MG per tablet   Start taking on:  1/19/2018    NORCO     ONE  TABLET  TWICE PRN PAIN Limit  acetaminophen to 4000 mg per day from all sources.        insulin glargine 100 UNIT/ML injection    LANTUS     Inject 30 units daily under the skin        ipratropium 17 MCG/ACT Inhaler    ATROVENT HFA     Inhale 2 puffs into the lungs        isosorbide mononitrate 30 MG 24 hr tablet    IMDUR     Take 30 mg by mouth        leuprolide 45 MG kit    LUPRON DEPOT     SHOT EVERY 6 MONTHS        losartan 25 MG tablet    COZAAR     Take 25 mg by mouth        metFORMIN 500 MG tablet    GLUCOPHAGE     Take 500 mg by mouth        nitroGLYcerin 0.4 MG sublingual tablet    NITROSTAT     Place 0.4 mg under the tongue        tamsulosin 0.4 MG capsule    FLOMAX     Take 0.4 mg by mouth        warfarin 5 MG tablet    COUMADIN     2.5 mg daily   7.5 mg M-F  Monitored by irena BOLANOS

## 2018-01-03 NOTE — PROGRESS NOTES
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      RADIATION RX:  John Mcmanus has received 2280 cGy to date for treatment of the above-described prostate carcinoma.      SUBJECTIVE:  He is still doing well with this treatment.  Nocturia once last night.  Stools maybe a bit softer, but no kim diarrhea.      OBJECTIVE:  Weight 206 pounds.  Abdomen is benign.  Bowel sounds present.      IMPRESSION:  Routine tolerance to radiation therapy for prostate carcinoma.      PLAN:  Continue treatment as planned.         DAJA BERNARDO MD             D: 2018 11:04   T: 2018 11:33   MT: TAYLER      Name:     JOHN MCMANUS   MRN:      [id number]        Account:      [id number]   :      1934           Service Date: 2018      Document: [id number]       cc: Singh Oglesby MD

## 2018-01-03 NOTE — MR AVS SNAPSHOT
After Visit Summary   1/3/2018    Chelsea Harden    MRN: [id number]           Patient Information     Date Of Birth          2/22/1934        Visit Information        Provider Department      1/3/2018 10:45 AM Emerson Costello MD [facility]        Today's Diagnoses     Prostate cancer (H)    -  1       Follow-ups after your visit        Your next 10 appointments already scheduled     Jan 04, 2018 10:30 AM CST   Treatment with [facility]   [facility] Radiation Oncology ([facility] )    [address] [address]   [phone]            Jan 05, 2018 10:30 AM CST   Treatment with [facility]   [facility] Radiation Oncology ([facility] )    [address] [address]   [phone]            Jan 08, 2018 10:30 AM CST   Treatment with [facility]   [facility] Radiation Oncology ([facility] )    [address] [address]   [phone]            Jan 09, 2018 10:30 AM CST   Treatment with [facility]   [facility] Radiation Oncology ([facility] )    [address] [address]   [phone]            Jamie 10, 2018 10:30 AM CST   Treatment with [facility]   [facility] Radiation [facility]cology ([facility] )    [address] [address]   [phone]            Jamie 10, 2018 11:00 AM CST   on treatment visit with Emerson Costello MD   [facility] Radiation Oncology ([facility] )    [address] [address]   [phone]            Jan 11, 2018 10:30 AM CST   Treatment with [facility]   [facility] Radiation Oncology ([facility] )    [address] [address]   [phone]            Jan 12, 2018 10:30 AM CST   Treatment with [facility]   [facility] Radiation Oncology ([facility] )    [address] [address]   [phone]            Jamie 15, 2018 10:30 AM CST   Treatment with [facility] CL[facility] IX   [facility] Radiation Oncology ([place]  "[facility] )    [address] [address] [phone]            2018 10:30 AM CST   Treatment with [facility] CL[facility] IX   [facility] Radiation Oncology ([facility] )    [address] [id number] [phone]              Who to contact     If you have questions or need follow up information about today's clinic visit or your schedule please contact [facility] directly at [phone].  Normal or non-critical lab and imaging results will be communicated to you by MyChart, letter or phone within 4 business days after the clinic has received the results. If you do not hear from us within 7 days, please contact the clinic through [employer] or phone. If you have a critical or abnormal lab result, we will notify you by phone as soon as possible.  Submit refill requests through [employer] or call your pharmacy and they will forward the refill request to us. Please allow 3 business days for your refill to be completed.          Additional Information About Your Visit        [employer] Information     [employer] lets you send messages to your doctor, view your test results, renew your prescriptions, schedule appointments and more. To sign up, go to [email or web address] . Click on \"Log in\" on the left side of the screen, which will take you to the Welcome page. Then click on \"Sign up Now\" on the right side of the page.     You will be asked to enter the access code listed below, as well as some personal information. Please follow the directions to create your username and password.     Your access code is: [email or web address]  Expires: 2018 11:17 AM     Your access code will  in 90 days. If you need help or a new code, please call your [place] clinic or [phone].        Care EveryWhere ID     This is your Care EveryWhere ID. This could be used by other organizations to access your [place] medical records  [id number]        Your Vitals Were     Pulse " Respirations BMI (Body Mass Index)             60 16 31.32 kg/m2          Blood Pressure from Last 3 Encounters:   01/03/18 108/64   12/27/17 124/72   12/20/17 124/66    Weight from Last 3 Encounters:   01/03/18 93.4 kg (206 lb)   12/27/17 94.3 kg (208 lb)   12/20/17 92.5 kg (204 lb)              Today, you had the following     No orders found for display       Primary Care Provider    None Specified       No primary provider on file.        Equal Access to Services     CRISTINA ZAMARRIPA : Hadii chio arguetao Solazarus, waaxda luqadaha, qaybta kaalmada shravan, mayito sorensen . So [facility] [phone].    ATENCIÓN: Si habla español, tiene a ramirez disposición servicios gratuitos de asistencia lingüística. Llame al [phone].    We comply with applicable federal civil rights laws and [place] laws. We do not discriminate on the basis of race, color, national origin, age, disability, sex, sexual orientation, or gender identity.            Thank you!     Thank you for choosing [facility]  for your care. Our goal is always to provide you with excellent care. Hearing back from our patients is one way we can continue to improve our services. Please take a few minutes to complete the written survey that you may receive in the mail after your visit with us. Thank you!             Your Updated Medication List - Protect others around you: Learn how to safely use, store and throw away your medicines at www.disposemymeds.org.          This list is accurate as of: 1/3/18  2:42 PM.  Always use your most recent med list.                   Brand Name Dispense Instructions for use Diagnosis    atorvastatin 20 MG tablet    LIPITOR     Take 10 mg by mouth        busPIRone 10 MG tablet    BUSPAR     1 1/2 tabs twice daily        enzalutamide 40 MG capsule    XTANDI     Take 80 mg by mouth        gabapentin 300 MG capsule    NEURONTIN     2 tablets morning, 2 afternoon, 3 at bedtime.         HYDROcodone-acetaminophen 5-325 MG per tablet   Start taking on:  1/19/2018    NORCO     ONE  TABLET  TWICE PRN PAIN Limit acetaminophen to 4000 mg per day from all sources.        insulin glargine 100 UNIT/ML injection    LANTUS     Inject 30 units daily under the skin        ipratropium 17 MCG/ACT Inhaler    ATROVENT HFA     Inhale 2 puffs into the lungs        isosorbide mononitrate 30 MG 24 hr tablet    IMDUR     Take 30 mg by mouth        leuprolide 45 MG kit    LUPRON DEPOT     SHOT EVERY 6 MONTHS        losartan 25 MG tablet    COZAAR     Take 25 mg by mouth        metFORMIN 500 MG tablet    GLUCOPHAGE     Take 500 mg by mouth        nitroGLYcerin 0.4 MG sublingual tablet    NITROSTAT     Place 0.4 mg under the tongue        tamsulosin 0.4 MG capsule    FLOMAX     Take 0.4 mg by mouth        warfarin 5 MG tablet    COUMADIN     2.5 mg daily   7.5 mg M-F  Monitored by irena BOLANOS

## 2018-01-04 ENCOUNTER — ALLIED HEALTH/NURSE VISIT (OUTPATIENT)
Dept: RADIATION ONCOLOGY | Facility: HOSPITAL | Age: 83
End: 2018-01-04

## 2018-01-04 DIAGNOSIS — C61 PROSTATE CANCER (H): Primary | ICD-10-CM

## 2018-01-04 PROCEDURE — 77385 ZZH IMRT TREATMENT DELIVERY, SIMPLE: CPT | Performed by: RADIOLOGY

## 2018-01-04 NOTE — MR AVS SNAPSHOT
After Visit Summary   1/4/2018    Chelsea Harden    MRN: [id number]           Patient Information     Date Of Birth          2/22/1934        Visit Information        Provider Department      1/4/2018 10:30 AM [facility]        Today's Diagnoses     Prostate cancer (H)    -  1       Follow-ups after your visit        Your next 10 appointments already scheduled     Jan 05, 2018 10:30 AM CST   Treatment with [facility] IX   [facility] Radiation Oncology ([facility] )    [address] [address]   [phone]            Jan 08, 2018 10:30 AM CST   Treatment with HI CL[facility] IX   [facility] Radiation Oncology ([facility] )    [address] [address]   [phone]            Jan 09, 2018 10:30 AM CST   Treatment with [facility] IX   [facility] Radiation [facility] ([facility] )    [address] [address]   [phone]            Jamie 10, 2018 10:30 AM CST   Treatment with [facility]   [facility] Radiation [facility] ([facility] )    [address] [address]   [phone]            Jamie 10, 2018 11:00 AM CST   on treatment visit with Emerson Costello MD   [facility] Radiation [facility] ([facility] )    [address] [address]   [phone]            Jan 11, 2018 10:30 AM CST   Treatment with HI CL[facility] IX   [facility] Radiation [facility] ([facility] )    [address] [address]   [phone]            Jan 12, 2018 10:30 AM CST   Treatment with HI CL[facility] IX   [facility] Radiation [facility]cology ([facility] )    [address] [address]   [phone]            Jamie 15, 2018 10:30 AM CST   Treatment with HI CL[facility] IX   [facility] Radiation [facility] ([facility] )    [address] [address]   [phone]            Jan 16, 2018 10:30 AM CST   Treatment with HI CLIN[facility] IX   [facility] Radiation Oncology ([facility]  "[facility] )    [address] [address] [phone]            2018 10:30 AM CST   Treatment with HI CL[facility] IX   [facility] Radiation Oncology ([facility] )    [address] [address] [phone]              Who to contact     If you have questions or need follow up information about today's clinic visit or your schedule please contact [facility] directly at [phone].  Normal or non-critical lab and imaging results will be communicated to you by JustFabhart, letter or phone within 4 business days after the clinic has received the results. If you do not hear from us within 7 days, please contact the clinic through Just[employer] or phone. If you have a critical or abnormal lab result, we will notify you by phone as soon as possible.  Submit refill requests through [employer] or call your pharmacy and they will forward the refill request to us. Please allow 3 business days for your refill to be completed.          Additional Information About Your Visit        TaxJar Information     Tax[employer]ar lets you send messages to your doctor, view your test results, renew your prescriptions, schedule appointments and more. To sign up, go to [email or web address] . Click on \"Log in\" on the left side of the screen, which will take you to the Welcome page. Then click on \"Sign up Now\" on the right side of the page.     You will be asked to enter the access code listed below, as well as some personal information. Please follow the directions to create your username and password.     Your access code is: [email or web address]  Expires: 2018 11:17 AM     Your access code will  in 90 days. If you need help or a new code, please call your [place] clinic or [phone].        Care EveryWhere ID     This is your Care EveryWhere ID. This could be used by other organizations to access your [place] medical records  [id number]         Blood Pressure from Last 3 Encounters: "   01/03/18 108/64   12/27/17 124/72   12/20/17 124/66    Weight from Last 3 Encounters:   01/03/18 93.4 kg (206 lb)   12/27/17 94.3 kg (208 lb)   12/20/17 92.5 kg (204 lb)              Today, you had the following     No orders found for display       Primary Care Provider    None Specified       No primary provider on file.        Equal Access to Services     EZEQUIEL ZAMARRIPA : Hadii chio ku ellieo Sofunmiali, waaxda luqadaha, qaybta kaalmada adeegyada, mayito gibson taryntiffany rubinchancemariano sorensen . So [facility] [phone].    ATENCIÓN: Si habla raul, tiene a ramirez disposición servicios gratuitos de asistencia lingüística. Llame al [phone].    We comply with applicable federal civil rights laws and [place] laws. We do not discriminate on the basis of race, color, national origin, age, disability, sex, sexual orientation, or gender identity.            Thank you!     Thank you for choosing [facility]  for your care. Our goal is always to provide you with excellent care. Hearing back from our patients is one way we can continue to improve our services. Please take a few minutes to complete the written survey that you may receive in the mail after your visit with us. Thank you!             Your Updated Medication List - Protect others around you: Learn how to safely use, store and throw away your medicines at www.disposemymeds.org.          This list is accurate as of: 1/4/18 11:05 AM.  Always use your most recent med list.                   Brand Name Dispense Instructions for use Diagnosis    atorvastatin 20 MG tablet    LIPITOR     Take 10 mg by mouth        busPIRone 10 MG tablet    BUSPAR     1 1/2 tabs twice daily        enzalutamide 40 MG capsule    XTANDI     Take 80 mg by mouth        gabapentin 300 MG capsule    NEURONTIN     2 tablets morning, 2 afternoon, 3 at bedtime.        HYDROcodone-acetaminophen 5-325 MG per tablet   Start taking on:  1/19/2018    NORCO     ONE  TABLET  TWICE PRN PAIN Limit  acetaminophen to 4000 mg per day from all sources.        insulin glargine 100 UNIT/ML injection    LANTUS     Inject 30 units daily under the skin        ipratropium 17 MCG/ACT Inhaler    ATROVENT HFA     Inhale 2 puffs into the lungs        isosorbide mononitrate 30 MG 24 hr tablet    IMDUR     Take 30 mg by mouth        leuprolide 45 MG kit    LUPRON DEPOT     SHOT EVERY 6 MONTHS        losartan 25 MG tablet    COZAAR     Take 25 mg by mouth        metFORMIN 500 MG tablet    GLUCOPHAGE     Take 500 mg by mouth        nitroGLYcerin 0.4 MG sublingual tablet    NITROSTAT     Place 0.4 mg under the tongue        tamsulosin 0.4 MG capsule    FLOMAX     Take 0.4 mg by mouth        warfarin 5 MG tablet    COUMADIN     2.5 mg daily   7.5 mg M-F  Monitored by irena BOLANOS

## 2018-01-04 NOTE — PROGRESS NOTES
Chelsea Harden received radiation therapy treatment today 01/04/18.    Josias Pizano  January 4, 2018  10:55 AM

## 2018-01-05 ENCOUNTER — ALLIED HEALTH/NURSE VISIT (OUTPATIENT)
Dept: RADIATION ONCOLOGY | Facility: HOSPITAL | Age: 83
End: 2018-01-05

## 2018-01-05 DIAGNOSIS — C61 PROSTATE CANCER (H): Primary | ICD-10-CM

## 2018-01-05 PROCEDURE — 77385 ZZH IMRT TREATMENT DELIVERY, SIMPLE: CPT | Performed by: RADIOLOGY

## 2018-01-05 NOTE — MR AVS SNAPSHOT
After Visit Summary   1/5/2018    Chelsea Harden    MRN: [id number]           Patient Information     Date Of Birth          2/22/1934        Visit Information        Provider Department      1/5/2018 10:30 AM [facility]        Today's Diagnoses     Prostate cancer (H)    -  1       Follow-ups after your visit        Your next 10 appointments already scheduled     Jan 08, 2018 10:30 AM CST   Treatment with [facility]   [facility] ([facility] )    [address] [address]   [phone]            Jan 09, 2018 10:30 AM CST   Treatment with [facility]   [facility] ([facility] )    [address] [address]   [phone]            Jamie 10, 2018 10:30 AM CST   Treatment with [facility]   [facility] ([facility] )    [address] [address]   [phone]            Jamie 10, 2018 11:00 AM CST   on treatment visit with Emerson Costello MD   [facility] ([facility] )    [address] [address]   [phone]            Jan 11, 2018 10:30 AM CST   Treatment with [facility]   [facility] ([facility] )    [address] [address]   [phone]            Jan 12, 2018 10:30 AM CST   Treatment with [facility]   [facility] ([facility] )    [address] [address]   [phone]            Jamie 15, 2018 10:30 AM CST   Treatment with [facility]   [facility] ([facility] )    [address] [address]   [phone]            Jan 16, 2018 10:30 AM CST   Treatment with [facility]   [facility] ([facility] )    [address] [address]   [phone]            Jan 17, 2018 10:30 AM CST   Treatment with [facility]   [facility] Radiation Oncology ([facility]  "[facility] )    [address] [place] [address] [phone]            2018 11:00 AM CST   on treatment visit with Emerson Costello MD   [facility] Rad[facility] ([facility] )    [address] [place] [address] [phone]              Who to contact     If you have questions or need follow up information about today's clinic visit or your schedule please contact [facility] directly at [phone].  Normal or non-critical lab and imaging results will be communicated to you by MyChart, letter or phone within 4 business days after the clinic has received the results. If you do not hear from us within 7 days, please contact the clinic through Zolpyhart or phone. If you have a critical or abnormal lab result, we will notify you by phone as soon as possible.  Submit refill requests through AWID or call your pharmacy and they will forward the refill request to us. Please allow 3 business days for your refill to be completed.          Additional Information About Your Visit        Zol[employer] Information     AWID lets you send messages to your doctor, view your test results, renew your prescriptions, schedule appointments and more. To sign up, go to [email or web address] . Click on \"Log in\" on the left side of the screen, which will take you to the Welcome page. Then click on \"Sign up Now\" on the right side of the page.     You will be asked to enter the access code listed below, as well as some personal information. Please follow the directions to create your username and password.     Your access code is: [email or web address]  Expires: 2018 11:17 AM     Your access code will  in 90 days. If you need help or a new code, please call your [facility] clinic or [phone].        Care EveryWhere ID     This is your Care EveryWhere ID. This could be used by other organizations to access your [facility] medical records  [id number]         Blood Pressure from Last 3 " Encounters:   01/03/18 108/64   12/27/17 124/72   12/20/17 124/66    Weight from Last 3 Encounters:   01/03/18 93.4 kg (206 lb)   12/27/17 94.3 kg (208 lb)   12/20/17 92.5 kg (204 lb)              Today, you had the following     No orders found for display       Primary Care Provider    None Specified       No primary provider on file.        Equal Access to Services     [facility] : Hadii chio arguetao Solazarus, wasarahda luqadaha, qaybta kaalmada shravan, maytio rubinchancemariano sorensen . So [facility] [phone].    ATENCIÓN: Si jose carter, tiene a ramirez disposición servicios gratuitos de asistencia lingüística. Llame al [phone].    We comply with applicable federal civil rights laws and [place] laws. We do not discriminate on the basis of race, color, national origin, age, disability, sex, sexual orientation, or gender identity.            Thank you!     Thank you for choosing [facility]  for your care. Our goal is always to provide you with excellent care. Hearing back from our patients is one way we can continue to improve our services. Please take a few minutes to complete the written survey that you may receive in the mail after your visit with us. Thank you!             Your Updated Medication List - Protect others around you: Learn how to safely use, store and throw away your medicines at www.disposemymeds.org.          This list is accurate as of: 1/5/18 10:46 AM.  Always use your most recent med list.                   Brand Name Dispense Instructions for use Diagnosis    atorvastatin 20 MG tablet    LIPITOR     Take 10 mg by mouth        busPIRone 10 MG tablet    BUSPAR     1 1/2 tabs twice daily        enzalutamide 40 MG capsule    XTANDI     Take 80 mg by mouth        gabapentin 300 MG capsule    NEURONTIN     2 tablets morning, 2 afternoon, 3 at bedtime.        HYDROcodone-acetaminophen 5-325 MG per tablet   Start taking on:  1/19/2018    NORCO     ONE  TABLET  TWICE PRN  PAIN Limit acetaminophen to 4000 mg per day from all sources.        insulin glargine 100 UNIT/ML injection    LANTUS     Inject 30 units daily under the skin        ipratropium 17 MCG/ACT Inhaler    ATROVENT HFA     Inhale 2 puffs into the lungs        isosorbide mononitrate 30 MG 24 hr tablet    IMDUR     Take 30 mg by mouth        leuprolide 45 MG kit    LUPRON DEPOT     SHOT EVERY 6 MONTHS        losartan 25 MG tablet    COZAAR     Take 25 mg by mouth        metFORMIN 500 MG tablet    GLUCOPHAGE     Take 500 mg by mouth        nitroGLYcerin 0.4 MG sublingual tablet    NITROSTAT     Place 0.4 mg under the tongue        tamsulosin 0.4 MG capsule    FLOMAX     Take 0.4 mg by mouth        warfarin 5 MG tablet    COUMADIN     2.5 mg daily   7.5 mg M-F  Monitored by irena BOLANOS

## 2018-01-05 NOTE — PROGRESS NOTES
Chelsea Harden received radiation therapy treatment today 01/05/18.    Josias Pizano  January 5, 2018  10:30 AM

## 2018-01-10 ENCOUNTER — ALLIED HEALTH/NURSE VISIT (OUTPATIENT)
Dept: RADIATION ONCOLOGY | Facility: HOSPITAL | Age: 83
End: 2018-01-10

## 2018-01-10 ENCOUNTER — OFFICE VISIT (OUTPATIENT)
Dept: RADIATION ONCOLOGY | Facility: HOSPITAL | Age: 83
End: 2018-01-10

## 2018-01-10 VITALS
SYSTOLIC BLOOD PRESSURE: 102 MMHG | DIASTOLIC BLOOD PRESSURE: 64 MMHG | RESPIRATION RATE: 20 BRPM | WEIGHT: 198 LBS | HEART RATE: 68 BPM | BODY MASS INDEX: 30.11 KG/M2

## 2018-01-10 DIAGNOSIS — C61 PROSTATE CANCER (H): Primary | ICD-10-CM

## 2018-01-10 PROCEDURE — 77336 RADIATION PHYSICS CONSULT: CPT | Performed by: RADIOLOGY

## 2018-01-10 PROCEDURE — 77385 ZZH IMRT TREATMENT DELIVERY, SIMPLE: CPT | Performed by: RADIOLOGY

## 2018-01-10 RX ORDER — ASPIRIN 81 MG/1
81 TABLET, CHEWABLE ORAL
COMMUNITY
Start: 2018-01-10 | End: 2018-02-09

## 2018-01-10 RX ORDER — FLUTICASONE PROPIONATE 50 MCG
1 SPRAY, SUSPENSION (ML) NASAL
COMMUNITY
Start: 2018-01-10 | End: 2018-01-25

## 2018-01-10 RX ORDER — HYDROCODONE BITARTRATE AND ACETAMINOPHEN 5; 325 MG/1; MG/1
TABLET ORAL
COMMUNITY
Start: 2018-01-19

## 2018-01-10 ASSESSMENT — PAIN SCALES - GENERAL: PAINLEVEL: MODERATE PAIN (4)

## 2018-01-10 NOTE — PROGRESS NOTES
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      RADIATION RX:  John Mcmanus has received 2850 cGy to date for treatment of the above-described prostate carcinoma.      SUBJECTIVE:  He is doing fairly well with his treatment.  He is having respiratory problems right now, recently exacerbated and actually is residing in a nursing home.      OBJECTIVE:  Weight 198 pounds.  Abdomen is benign.  Bowel sounds present.      IMPRESSION:  Routine tolerance to radiation therapy for prostate carcinoma with recent exacerbation of pulmonary symptomatology.      PLAN:  Continue treatment as planned.         DAJA BERNARDO MD             D: 01/10/2018 11:03   T: 01/10/2018 11:11   MT: MAGDALENA      Name:     JOHN MCMANUS   MRN:      [id number]        Account:      [id number]   :      1934           Service Date: 01/10/2018      Document: [id number]

## 2018-01-10 NOTE — MR AVS SNAPSHOT
After Visit Summary   1/10/2018    Chelsea Harden    MRN: [id number]           Patient Information     Date Of Birth          2/22/1934        Visit Information        Provider Department      1/10/2018 10:45 AM Emerson Costello MD [facility]        Today's Diagnoses     Prostate cancer (H)    -  1       Follow-ups after your visit        Your next 10 appointments already scheduled     Jan 11, 2018 10:30 AM CST   Treatment with [facility]   [facility] Radiation Oncology ([facility] )    [address] [address]   [phone]            Jan 12, 2018 10:30 AM CST   Treatment with [facility]   [facility] Radiation Oncology ([facility] )    [address] [address]   [phone]            Jamie 15, 2018 10:30 AM CST   Treatment with [facility]   [facility] Radiation [facility]cology ([facility] )    [address] [address]   [phone]            Jan 16, 2018 10:30 AM CST   Treatment with [facility]   [facility] Rad[facility]cology ([facility] )    [address] [address]   [phone]            Jan 17, 2018 10:30 AM CST   Treatment with [facility]   [facility] ([facility] )    [address] [address]   [phone]            Jan 17, 2018 11:00 AM CST   on treatment visit with Emerson Costello MD   [facility] Rad[facility] ([facility] )    [address] [address]   [phone]            Jan 18, 2018 10:30 AM CST   Treatment with [facility]   [facility] Radiation [facility]cology ([facility] )    [address] [address]   [phone]            Jan 19, 2018 10:30 AM CST   Treatment with [facility]   [facility] Rad[facility] ([facility] )    [address] [address]   [phone]            Jan 22, 2018 10:30 AM CST   Treatment with [facility]   [facility] Radiation Oncology  "([facility] )    [address] [address] [phone]            2018 10:30 AM CST   Treatment with [facility]   [facility] Radiation Oncology ([facility] )    [address] [address] [phone]              Who to contact     If you have questions or need follow up information about today's clinic visit or your schedule please contact [facility] directly at [phone].  Normal or non-critical lab and imaging results will be communicated to you by Quillhart, letter or phone within 4 business days after the clinic has received the results. If you do not hear from us within 7 days, please contact the clinic through Quillhart or phone. If you have a critical or abnormal lab result, we will notify you by phone as soon as possible.  Submit refill requests through Ramen or call your pharmacy and they will forward the refill request to us. Please allow 3 business days for your refill to be completed.          Additional Information About Your Visit        Ramen Information     Ramen lets you send messages to your doctor, view your test results, renew your prescriptions, schedule appointments and more. To sign up, go to [email or web address] . Click on \"Log in\" on the left side of the screen, which will take you to the Welcome page. Then click on \"Sign up Now\" on the right side of the page.     You will be asked to enter the access code listed below, as well as some personal information. Please follow the directions to create your username and password.     Your access code is: [email or web address]  Expires: 2018 11:17 AM     Your access code will  in 90 days. If you need help or a new code, please call your [facility] clinic or [phone].        Care EveryWhere ID     This is your Care EveryWhere ID. This could be used by other organizations to access your [facility] medical records  [id number]        Your Vitals Were     Pulse " Respirations BMI (Body Mass Index)             68 20 30.11 kg/m2          Blood Pressure from Last 3 Encounters:   01/10/18 102/64   01/03/18 108/64   12/27/17 124/72    Weight from Last 3 Encounters:   01/10/18 89.8 kg (198 lb)   01/03/18 93.4 kg (206 lb)   12/27/17 94.3 kg (208 lb)              Today, you had the following     No orders found for display         Today's Medication Changes          These changes are accurate as of: 1/10/18  2:41 PM.  If you have any questions, ask your nurse or doctor.               These medicines have changed or have updated prescriptions.        Dose/Directions    HYDROcodone-acetaminophen 5-325 MG per tablet   Commonly known as:  NORCO   This may have changed:  Another medication with the same name was removed. Continue taking this medication, and follow the directions you see here.   Changed by:  Emerson Costello MD        Start taking on:  1/19/2018   ONE  TABLET  TWICE PRN PAIN Limit acetaminophen to 4000 mg per day from all sources.   Refills:  0                Primary Care Provider    None Specified       No primary provider on file.        Equal Access to Services     [facility]: Hadii chio Lombardo, waaxda luaryan, qaybta kaalmaadelaida cheney, mayito sorensen . So [facility] [phone].    ATENCIÓN: Si habla español, tiene a ramirez disposición servicios gratuitos de asistencia lingüística. Llame al [phone].    We comply with applicable federal civil rights laws and [place] laws. We do not discriminate on the basis of race, color, national origin, age, disability, sex, sexual orientation, or gender identity.            Thank you!     Thank you for choosing [facility]  for your care. Our goal is always to provide you with excellent care. Hearing back from our patients is one way we can continue to improve our services. Please take a few minutes to complete the written survey that you may receive in the mail after your visit  with us. Thank you!             Your Updated Medication List - Protect others around you: Learn how to safely use, store and throw away your medicines at www.disposemymeds.org.          This list is accurate as of: 1/10/18  2:41 PM.  Always use your most recent med list.                   Brand Name Dispense Instructions for use Diagnosis    aspirin 81 MG chewable tablet      81 mg        atorvastatin 20 MG tablet    LIPITOR     Take 10 mg by mouth        busPIRone 10 MG tablet    BUSPAR     1 1/2 tabs twice daily        enzalutamide 40 MG capsule    XTANDI     Take 80 mg by mouth        fluticasone 50 MCG/ACT spray    FLONASE     Spray 1 spray in nostril        gabapentin 300 MG capsule    NEURONTIN     2 tablets morning, 2 afternoon, 3 at bedtime.        HYDROcodone-acetaminophen 5-325 MG per tablet   Start taking on:  1/19/2018    NORCO     ONE  TABLET  TWICE PRN PAIN Limit acetaminophen to 4000 mg per day from all sources.        insulin glargine 100 UNIT/ML injection    LANTUS     Inject 30 units daily under the skin        ipratropium 17 MCG/ACT Inhaler    ATROVENT HFA     Inhale 2 puffs into the lungs        isosorbide mononitrate 30 MG 24 hr tablet    IMDUR     Take 30 mg by mouth        leuprolide 45 MG kit    LUPRON DEPOT     SHOT EVERY 6 MONTHS        losartan 25 MG tablet    COZAAR     Take 25 mg by mouth        metFORMIN 500 MG tablet    GLUCOPHAGE     Take 500 mg by mouth        nitroGLYcerin 0.4 MG sublingual tablet    NITROSTAT     Place 0.4 mg under the tongue        tamsulosin 0.4 MG capsule    FLOMAX     Take 0.4 mg by mouth        warfarin 5 MG tablet    COUMADIN     2.5 mg daily   7.5 mg M-F  Monitored by irena BOLANOS

## 2018-01-10 NOTE — MR AVS SNAPSHOT
After Visit Summary   1/10/2018    Chelsea Harden    MRN: [id number]           Patient Information     Date Of Birth          2/22/1934        Visit Information        Provider Department      1/10/2018 10:30 AM [facility]        Today's Diagnoses     Prostate cancer (H)    -  1       Follow-ups after your visit        Your next 10 appointments already scheduled     Jan 11, 2018 10:30 AM CST   Treatment with [facility]   [facility] Radiation Oncology ([facility] )    [address] [address]   [phone]            Jan 12, 2018 10:30 AM CST   Treatment with [facility] IX   [facility] Radiation Oncology ([facility] )    [address] [address]   [phone]            Jamie 15, 2018 10:30 AM CST   Treatment with [facility] IX   [facility] Radiation Oncology ([facility] )    [address] [address]   [phone]            Jan 16, 2018 10:30 AM CST   Treatment with [facility]   [facility] Radiation Oncology ([facility] )    [address] [address]   [phone]            Jan 17, 2018 10:30 AM CST   Treatment with [facility] IX   [facility] Radiation Oncology ([facility] )    [address] [address]   [phone]            Jan 17, 2018 11:00 AM CST   on treatment visit with Emerson Costello MD   [facility] Rad[facility] ([facility] )    [address] [address]   [phone]            Jan 18, 2018 10:30 AM CST   Treatment with [facility] CL[facility] IX   [facility] Radiation Oncology ([facility] )    [address] [address]   [phone]            Jan 19, 2018 10:30 AM CST   Treatment with [facility] CL[facility] IX   [facility] Radiation Oncology ([facility] )    [address] [address]   [phone]            Jan 22, 2018 10:30 AM CST   Treatment with HI CL[facility] IX   [facility] Radiation Oncology ([place]  [facility] )    [address] [address] [phone]            2018 10:30 AM CST   Treatment with HI CL[facility] IX   [facility] Radiation Oncology ([facility] )    [address] [id number] [phone]              Who to contact     If you have questions or need follow up information about today's clinic visit or your schedule please contact [facility] directly at [phone].  Normal or non-critical lab and imaging results will be communicated to you by MyChart, letter or phone within 4 business days after the clinic has received the results. If you do not hear from us within 7 days, please contact the clinic through Connect Controlshart or phone. If you have a critical or abnormal lab result, we will notify you by phone as soon as possible.  Submit refill requests through [employer] or call your pharmacy and they will forward the refill request to us. Please allow 3 business days for your refill to be completed.          Additional Information About Your Visit        [employer] Information     [employer] lets you send messages to your doctor, view your test results, renew your prescriptions, schedule appointments and more. To sign up, go to [email or web address] . Click on \"Log in\" on the left side of the screen, which will take you to the Welcome page. Then click on \"Sign up Now\" on the right side of the page.     You will be asked to enter the access code listed below, as well as some personal information. Please follow the directions to create your username and password.     Your access code is: [email or web address]  Expires: 2018 11:17 AM     Your access code will  in 90 days. If you need help or a new code, please call your [facility] clinic or [phone].        Care EveryWhere ID     This is your Care EveryWhere ID. This could be used by other organizations to access your [facility] medical records  [id number]         Blood Pressure from Last 3 " Encounters:   01/10/18 102/64   01/03/18 108/64   12/27/17 124/72    Weight from Last 3 Encounters:   01/10/18 89.8 kg (198 lb)   01/03/18 93.4 kg (206 lb)   12/27/17 94.3 kg (208 lb)              Today, you had the following     No orders found for display         Today's Medication Changes          These changes are accurate as of: 1/10/18 10:54 AM.  If you have any questions, ask your nurse or doctor.               These medicines have changed or have updated prescriptions.        Dose/Directions    HYDROcodone-acetaminophen 5-325 MG per tablet   Commonly known as:  NORCO   This may have changed:  Another medication with the same name was removed. Continue taking this medication, and follow the directions you see here.   Changed by:  Emerson Costello MD        Start taking on:  1/19/2018   ONE  TABLET  TWICE PRN PAIN Limit acetaminophen to 4000 mg per day from all sources.   Refills:  0                Primary Care Provider    None Specified       No primary provider on file.        Equal Access to Services     [facility]: Hadii chio arguetao Solazarus, waaxda lufletcheradaha, qaybta kaalmada ademartiyada, mayito sorensen . So [facility] [phone].    ATENCIÓN: Si habla español, tiene a ramirez disposición servicios gratuitos de asistencia lingüística. Llame al [phone].    We comply with applicable federal civil rights laws and [place] laws. We do not discriminate on the basis of race, color, national origin, age, disability, sex, sexual orientation, or gender identity.            Thank you!     Thank you for choosing [facility]  for your care. Our goal is always to provide you with excellent care. Hearing back from our patients is one way we can continue to improve our services. Please take a few minutes to complete the written survey that you may receive in the mail after your visit with us. Thank you!             Your Updated Medication List - Protect others around you: Learn how to  safely use, store and throw away your medicines at www.disposemymeds.org.          This list is accurate as of: 1/10/18 10:54 AM.  Always use your most recent med list.                   Brand Name Dispense Instructions for use Diagnosis    aspirin 81 MG chewable tablet      81 mg        atorvastatin 20 MG tablet    LIPITOR     Take 10 mg by mouth        busPIRone 10 MG tablet    BUSPAR     1 1/2 tabs twice daily        enzalutamide 40 MG capsule    XTANDI     Take 80 mg by mouth        fluticasone 50 MCG/ACT spray    FLONASE     Spray 1 spray in nostril        gabapentin 300 MG capsule    NEURONTIN     2 tablets morning, 2 afternoon, 3 at bedtime.        HYDROcodone-acetaminophen 5-325 MG per tablet   Start taking on:  1/19/2018    NORCO     ONE  TABLET  TWICE PRN PAIN Limit acetaminophen to 4000 mg per day from all sources.        insulin glargine 100 UNIT/ML injection    LANTUS     Inject 30 units daily under the skin        ipratropium 17 MCG/ACT Inhaler    ATROVENT HFA     Inhale 2 puffs into the lungs        isosorbide mononitrate 30 MG 24 hr tablet    IMDUR     Take 30 mg by mouth        leuprolide 45 MG kit    LUPRON DEPOT     SHOT EVERY 6 MONTHS        losartan 25 MG tablet    COZAAR     Take 25 mg by mouth        metFORMIN 500 MG tablet    GLUCOPHAGE     Take 500 mg by mouth        nitroGLYcerin 0.4 MG sublingual tablet    NITROSTAT     Place 0.4 mg under the tongue        tamsulosin 0.4 MG capsule    FLOMAX     Take 0.4 mg by mouth        warfarin 5 MG tablet    COUMADIN     2.5 mg daily   7.5 mg M-F  Monitored by irena BOLANOS

## 2018-01-10 NOTE — PROGRESS NOTES
Chelsea Harden received radiation therapy treatment today 01/10/18.    Marcelo Payne  January 10, 2018  10:39 AM

## 2018-01-11 ENCOUNTER — ALLIED HEALTH/NURSE VISIT (OUTPATIENT)
Dept: RADIATION ONCOLOGY | Facility: HOSPITAL | Age: 83
End: 2018-01-11

## 2018-01-11 DIAGNOSIS — C61 PROSTATE CANCER (H): Primary | ICD-10-CM

## 2018-01-11 PROCEDURE — 77385 ZZH IMRT TREATMENT DELIVERY, SIMPLE: CPT | Performed by: RADIOLOGY

## 2018-01-11 NOTE — MR AVS SNAPSHOT
After Visit Summary   1/11/2018    Chelsea Harden    MRN: [id number]           Patient Information     Date Of Birth          2/22/1934        Visit Information        Provider Department      1/11/2018 10:30 AM [facility]        Today's Diagnoses     Prostate cancer (H)    -  1       Follow-ups after your visit        Your next 10 appointments already scheduled     Jan 12, 2018 10:30 AM CST   Treatment with [facility]   [facility] Radiation Oncology ([facility] )    [address] [address] [phone]            Jamie 15, 2018 10:30 AM CST   Treatment with [facility]   [facility] Radiation Oncology ([facility] )    [address] [address] [phone]            Jan 16, 2018 10:30 AM CST   Treatment with [facility]   [facility] Radiation Oncology ([facility] )    [address] [address]   [phone]            Jan 17, 2018 10:30 AM CST   Treatment with [facility]   [facility] Radiation Oncology ([facility] )    [address] [address]   [phone]            Jan 17, 2018 11:00 AM CST   on treatment visit with Emerson Costello MD   [facility] Radiation Oncology ([facility] )    [address] [address]   [phone]            Jan 18, 2018 10:30 AM CST   Treatment with [facility]   [facility] Radiation Oncology ([facility] )    [address] [address]   [phone]            Jan 19, 2018 10:30 AM CST   Treatment with [facility]   [facility] Radiation Oncology ([facility] )    [address] [address]   [phone]            Jan 22, 2018 10:30 AM CST   Treatment with [facility]   [facility] Radiation Oncology ([facility] )    [address] [address] [phone]            Jan 23, 2018 10:30 AM CST   Treatment with [facility]   HI Radiation Oncology ([place]  "[facility] )    [address] [id number] [phone]            2018 10:30 AM CST   Treatment with [facility]   [facility] Radiation Oncology ([facility] )    [address] [id number] [phone]              Who to contact     If you have questions or need follow up information about today's clinic visit or your schedule please contact [facility] directly at [phone].  Normal or non-critical lab and imaging results will be communicated to you by MyChart, letter or phone within 4 business days after the clinic has received the results. If you do not hear from us within 7 days, please contact the clinic through Totsyhart or phone. If you have a critical or abnormal lab result, we will notify you by phone as soon as possible.  Submit refill requests through [employer] or call your pharmacy and they will forward the refill request to us. Please allow 3 business days for your refill to be completed.          Additional Information About Your Visit        LoveThatFit Information     [employer]That[employer] lets you send messages to your doctor, view your test results, renew your prescriptions, schedule appointments and more. To sign up, go to [email or web address] . Click on \"Log in\" on the left side of the screen, which will take you to the Welcome page. Then click on \"Sign up Now\" on the right side of the page.     You will be asked to enter the access code listed below, as well as some personal information. Please follow the directions to create your username and password.     Your access code is: [email or web address]  Expires: 2018 11:17 AM     Your access code will  in 90 days. If you need help or a new code, please call your [place] clinic or [phone].        Care EveryWhere ID     This is your Care EveryWhere ID. This could be used by other organizations to access your [place] medical records  [id number]         Blood Pressure from Last 3 " Encounters:   01/10/18 102/64   01/03/18 108/64   12/27/17 124/72    Weight from Last 3 Encounters:   01/10/18 89.8 kg (198 lb)   01/03/18 93.4 kg (206 lb)   12/27/17 94.3 kg (208 lb)              Today, you had the following     No orders found for display       Primary Care Provider    None Specified       No primary provider on file.        Equal Access to Services     EZEQUIEL ZAMARRIPA : Hadii chio arguetao Solazarus, wasarahda luqadaha, qaybta kaalmada shravan, mayito rubinchancemariano sorensen . So [facility] [phone].    ATENCIÓN: Si jose carter, tiene a ramirez disposición servicios gratuitos de asistencia lingüística. Llame al [phone].    We comply with applicable federal civil rights laws and [place] laws. We do not discriminate on the basis of race, color, national origin, age, disability, sex, sexual orientation, or gender identity.            Thank you!     Thank you for choosing [facility]  for your care. Our goal is always to provide you with excellent care. Hearing back from our patients is one way we can continue to improve our services. Please take a few minutes to complete the written survey that you may receive in the mail after your visit with us. Thank you!             Your Updated Medication List - Protect others around you: Learn how to safely use, store and throw away your medicines at www.disposemymeds.org.          This list is accurate as of: 1/11/18 10:59 AM.  Always use your most recent med list.                   Brand Name Dispense Instructions for use Diagnosis    aspirin 81 MG chewable tablet      81 mg        atorvastatin 20 MG tablet    LIPITOR     Take 10 mg by mouth        busPIRone 10 MG tablet    BUSPAR     1 1/2 tabs twice daily        enzalutamide 40 MG capsule    XTANDI     Take 80 mg by mouth        fluticasone 50 MCG/ACT spray    FLONASE     Spray 1 spray in nostril        gabapentin 300 MG capsule    NEURONTIN     2 tablets morning, 2 afternoon, 3 at  bedtime.        HYDROcodone-acetaminophen 5-325 MG per tablet   Start taking on:  1/19/2018    NORCO     ONE  TABLET  TWICE PRN PAIN Limit acetaminophen to 4000 mg per day from all sources.        insulin glargine 100 UNIT/ML injection    LANTUS     Inject 30 units daily under the skin        ipratropium 17 MCG/ACT Inhaler    ATROVENT HFA     Inhale 2 puffs into the lungs        isosorbide mononitrate 30 MG 24 hr tablet    IMDUR     Take 30 mg by mouth        leuprolide 45 MG kit    LUPRON DEPOT     SHOT EVERY 6 MONTHS        losartan 25 MG tablet    COZAAR     Take 25 mg by mouth        metFORMIN 500 MG tablet    GLUCOPHAGE     Take 500 mg by mouth        nitroGLYcerin 0.4 MG sublingual tablet    NITROSTAT     Place 0.4 mg under the tongue        tamsulosin 0.4 MG capsule    FLOMAX     Take 0.4 mg by mouth        warfarin 5 MG tablet    COUMADIN     2.5 mg daily   7.5 mg M-F  Monitored by irena BOLANOS

## 2018-01-11 NOTE — PROGRESS NOTES
Chelsea Harden received radiation therapy treatment today 01/11/18.    Josias Pizano  January 11, 2018  10:45 AM

## 2018-01-12 ENCOUNTER — ALLIED HEALTH/NURSE VISIT (OUTPATIENT)
Dept: RADIATION ONCOLOGY | Facility: HOSPITAL | Age: 83
End: 2018-01-12

## 2018-01-12 DIAGNOSIS — C61 PROSTATE CANCER (H): Primary | ICD-10-CM

## 2018-01-12 PROCEDURE — 77385 ZZH IMRT TREATMENT DELIVERY, SIMPLE: CPT | Performed by: RADIOLOGY

## 2018-01-12 NOTE — PROGRESS NOTES
Chelsea Harden received radiation therapy treatment today 01/12/18.    Mary Avila  January 12, 2018  10:42 AM

## 2018-01-12 NOTE — MR AVS SNAPSHOT
After Visit Summary   1/12/2018    Chelsea Harden    MRN: [id number]           Patient Information     Date Of Birth          2/22/1934        Visit Information        Provider Department      1/12/2018 10:30 AM [facility]        Today's Diagnoses     Prostate cancer (H)    -  1       Follow-ups after your visit        Your next 10 appointments already scheduled     Jamie 15, 2018 10:30 AM CST   Treatment with [facility]   [facility] Radiation Oncology ([facility] )    [address] [address]   [phone]            Jan 16, 2018 10:30 AM CST   Treatment with [facility]   [facility] Radiation Oncology ([facility] )    [address] [address]   [phone]            Jan 17, 2018 10:30 AM CST   Treatment with [facility]   [facility] Radiation Oncology ([facility] )    [address] [address]   [phone]            Jan 17, 2018 11:00 AM CST   on treatment visit with Emerson Costello MD   [facility] Radiation Oncology ([facility] )    [address] [address]   [phone]            Jan 18, 2018 10:30 AM CST   Treatment with [facility]   [facility] Radiation Oncology ([facility] )    [address] [address]   [phone]            Jan 19, 2018 10:30 AM CST   Treatment with [facility]   [facility] Radiation Oncology ([facility] )    [address] [address]   [phone]            Jan 22, 2018 10:30 AM CST   Treatment with [facility]   [facility] Radiation Oncology ([facility] )    [address] [address]   [phone]            Jan 23, 2018 10:30 AM CST   Treatment with [facility]   [facility] Radiation [facility]cology ([facility] )    [address] [address]   [phone]            Jan 24, 2018 10:30 AM CST   Treatment with [facility]   HI Radiation Oncology ([place]  "[facility] )    [address] [address] [phone]            2018 11:00 AM CST   on treatment visit with Emerson Costello MD   [facility] Radiation Oncology ([facility] )    [address] [address] [phone]              Who to contact     If you have questions or need follow up information about today's clinic visit or your schedule please contact [facility] directly at [phone].  Normal or non-critical lab and imaging results will be communicated to you by MyChart, letter or phone within 4 business days after the clinic has received the results. If you do not hear from us within 7 days, please contact the clinic through Global Locatehart or phone. If you have a critical or abnormal lab result, we will notify you by phone as soon as possible.  Submit refill requests through [employer] or call your pharmacy and they will forward the refill request to us. Please allow 3 business days for your refill to be completed.          Additional Information About Your Visit        [employer] Loc[employer] Information     [employer] lets you send messages to your doctor, view your test results, renew your prescriptions, schedule appointments and more. To sign up, go to [email or web address] . Click on \"Log in\" on the left side of the screen, which will take you to the Welcome page. Then click on \"Sign up Now\" on the right side of the page.     You will be asked to enter the access code listed below, as well as some personal information. Please follow the directions to create your username and password.     Your access code is: [email or web address]  Expires: 2018 11:17 AM     Your access code will  in 90 days. If you need help or a new code, please call your [place] clinic or [phone].        Care EveryWhere ID     This is your Care EveryWhere ID. This could be used by other organizations to access your [place] medical records  [id number]         Blood Pressure from " Last 3 Encounters:   01/10/18 102/64   01/03/18 108/64   12/27/17 124/72    Weight from Last 3 Encounters:   01/10/18 89.8 kg (198 lb)   01/03/18 93.4 kg (206 lb)   12/27/17 94.3 kg (208 lb)              Today, you had the following     No orders found for display       Primary Care Provider    None Specified       No primary provider on file.        Equal Access to Services     CRISTINA ZAMARRIPA : Hadii chio Lombardo, wasarahda alexandroadaha, qaybta kaalmada shravan, mayito rubinchancemariano sorensen . So [facility] [phone].    ATENCIÓN: Si jassonla raul, tiene a ramirez disposición servicios gratuitos de asistencia lingüística. Llame al [phone].    We comply with applicable federal civil rights laws and [place] laws. We do not discriminate on the basis of race, color, national origin, age, disability, sex, sexual orientation, or gender identity.            Thank you!     Thank you for choosing [facility]  for your care. Our goal is always to provide you with excellent care. Hearing back from our patients is one way we can continue to improve our services. Please take a few minutes to complete the written survey that you may receive in the mail after your visit with us. Thank you!             Your Updated Medication List - Protect others around you: Learn how to safely use, store and throw away your medicines at www.disposemymeds.org.          This list is accurate as of: 1/12/18 10:44 AM.  Always use your most recent med list.                   Brand Name Dispense Instructions for use Diagnosis    aspirin 81 MG chewable tablet      81 mg        atorvastatin 20 MG tablet    LIPITOR     Take 10 mg by mouth        busPIRone 10 MG tablet    BUSPAR     1 1/2 tabs twice daily        enzalutamide 40 MG capsule    XTANDI     Take 80 mg by mouth        fluticasone 50 MCG/ACT spray    FLONASE     Spray 1 spray in nostril        gabapentin 300 MG capsule    NEURONTIN     2 tablets morning, 2 afternoon, 3 at  bedtime.        HYDROcodone-acetaminophen 5-325 MG per tablet   Start taking on:  1/19/2018    NORCO     ONE  TABLET  TWICE PRN PAIN Limit acetaminophen to 4000 mg per day from all sources.        insulin glargine 100 UNIT/ML injection    LANTUS     Inject 30 units daily under the skin        ipratropium 17 MCG/ACT Inhaler    ATROVENT HFA     Inhale 2 puffs into the lungs        isosorbide mononitrate 30 MG 24 hr tablet    IMDUR     Take 30 mg by mouth        leuprolide 45 MG kit    LUPRON DEPOT     SHOT EVERY 6 MONTHS        losartan 25 MG tablet    COZAAR     Take 25 mg by mouth        metFORMIN 500 MG tablet    GLUCOPHAGE     Take 500 mg by mouth        nitroGLYcerin 0.4 MG sublingual tablet    NITROSTAT     Place 0.4 mg under the tongue        tamsulosin 0.4 MG capsule    FLOMAX     Take 0.4 mg by mouth        warfarin 5 MG tablet    COUMADIN     2.5 mg daily   7.5 mg M-F  Monitored by irena BOLANOS

## 2018-01-15 ENCOUNTER — ALLIED HEALTH/NURSE VISIT (OUTPATIENT)
Dept: RADIATION ONCOLOGY | Facility: HOSPITAL | Age: 83
End: 2018-01-15

## 2018-01-15 DIAGNOSIS — C61 PROSTATE CANCER (H): Primary | ICD-10-CM

## 2018-01-15 PROCEDURE — 77385 ZZH IMRT TREATMENT DELIVERY, SIMPLE: CPT | Performed by: RADIOLOGY

## 2018-01-15 NOTE — PROGRESS NOTES
Chelsea Harden received radiation therapy treatment today 01/15/18.    Josias Pizano  January 15, 2018  10:48 AM

## 2018-01-15 NOTE — MR AVS SNAPSHOT
After Visit Summary   1/15/2018    Chelsea Harden    MRN: [id number]           Patient Information     Date Of Birth          2/22/1934        Visit Information        Provider Department      1/15/2018 10:30 AM [facility] Rad[facility]        Today's Diagnoses     Prostate cancer (H)    -  1       Follow-ups after your visit        Your next 10 appointments already scheduled     Jan 16, 2018 10:30 AM CST   Treatment with [facility]   [facility] Radiation Oncology ([facility] )    [address] [address]   [phone]            Jan 17, 2018 10:30 AM CST   Treatment with [facility] CL[facility] IX   HI Radiation Oncology ([facility] )    [address] [address]   [phone]            Jan 17, 2018 11:00 AM CST   on treatment visit with Emerson Costello MD   [facility] Radiation Oncology ([facility] )    [address] [address]   [phone]            Jan 18, 2018 10:30 AM CST   Treatment with [facility]   [facility] Radiation Oncology ([facility] )    [address] [address]   [phone]            Jan 19, 2018 10:30 AM CST   Treatment with [facility]   [facility] Radiation Oncology ([facility] )    [address] [address]   [phone]            Jan 22, 2018 10:30 AM CST   Treatment with [facility] CL[facility] IX   [facility] Radiation Oncology ([facility] )    [address] [address]   [phone]            Jan 23, 2018 10:30 AM CST   Treatment with HI CL[facility] IX   [facility] Radiation Oncology ([facility] )    [address] [address]   [phone]            Jan 24, 2018 10:30 AM CST   Treatment with [facility] CL[facility] IX   [facility] Radiation Oncology ([facility] )    [address] [address]   [phone]            Jan 24, 2018 11:00 AM CST   on treatment visit with Emerson Costello MD   HI Radiation  "Oncology ([facility] )    [address] [address] [phone]            2018 10:30 AM CST   Treatment with [facility] CL[facility] IX   HI Radiation Oncology ([facility] )    [address] [address] [phone]              Who to contact     If you have questions or need follow up information about today's clinic visit or your schedule please contact [facility] directly at [phone].  Normal or non-critical lab and imaging results will be communicated to you by MyChart, letter or phone within 4 business days after the clinic has received the results. If you do not hear from us within 7 days, please contact the clinic through [employer] or phone. If you have a critical or abnormal lab result, we will notify you by phone as soon as possible.  Submit refill requests through [employer] or call your pharmacy and they will forward the refill request to us. Please allow 3 business days for your refill to be completed.          Additional Information About Your Visit        [employer] Information     [employer] lets you send messages to your doctor, view your test results, renew your prescriptions, schedule appointments and more. To sign up, go to [email or web address] . Click on \"Log in\" on the left side of the screen, which will take you to the Welcome page. Then click on \"Sign up Now\" on the right side of the page.     You will be asked to enter the access code listed below, as well as some personal information. Please follow the directions to create your username and password.     Your access code is: [email or web address]  Expires: 2018 11:17 AM     Your access code will  in 90 days. If you need help or a new code, please call your [place] clinic or [phone].        Care EveryWhere ID     This is your Care EveryWhere ID. This could be used by other organizations to access your [place] medical records  [id number]         Blood Pressure from " Last 3 Encounters:   01/10/18 102/64   01/03/18 108/64   12/27/17 124/72    Weight from Last 3 Encounters:   01/10/18 89.8 kg (198 lb)   01/03/18 93.4 kg (206 lb)   12/27/17 94.3 kg (208 lb)              Today, you had the following     No orders found for display       Primary Care Provider    None Specified       No primary provider on file.        Equal Access to Services     CRISTINA ZAMARRIPA : Hadii chio Lombardo, wasarahda alexandroadaha, qajose carlosta kaalmada shravan, mayito rubinchancemariano sorensen . So [facility] [phone].    ATENCIÓN: Si jassonla raul, tiene a ramirez disposición servicios gratuitos de asistencia lingüística. Llame al [phone].    We comply with applicable federal civil rights laws and [place] laws. We do not discriminate on the basis of race, color, national origin, age, disability, sex, sexual orientation, or gender identity.            Thank you!     Thank you for choosing [facility]  for your care. Our goal is always to provide you with excellent care. Hearing back from our patients is one way we can continue to improve our services. Please take a few minutes to complete the written survey that you may receive in the mail after your visit with us. Thank you!             Your Updated Medication List - Protect others around you: Learn how to safely use, store and throw away your medicines at www.disposemymeds.org.          This list is accurate as of: 1/15/18 10:54 AM.  Always use your most recent med list.                   Brand Name Dispense Instructions for use Diagnosis    aspirin 81 MG chewable tablet      81 mg        atorvastatin 20 MG tablet    LIPITOR     Take 10 mg by mouth        busPIRone 10 MG tablet    BUSPAR     1 1/2 tabs twice daily        enzalutamide 40 MG capsule    XTANDI     Take 80 mg by mouth        fluticasone 50 MCG/ACT spray    FLONASE     Spray 1 spray in nostril        gabapentin 300 MG capsule    NEURONTIN     2 tablets morning, 2 afternoon, 3 at  bedtime.        HYDROcodone-acetaminophen 5-325 MG per tablet   Start taking on:  1/19/2018    NORCO     ONE  TABLET  TWICE PRN PAIN Limit acetaminophen to 4000 mg per day from all sources.        insulin glargine 100 UNIT/ML injection    LANTUS     Inject 30 units daily under the skin        ipratropium 17 MCG/ACT Inhaler    ATROVENT HFA     Inhale 2 puffs into the lungs        isosorbide mononitrate 30 MG 24 hr tablet    IMDUR     Take 30 mg by mouth        leuprolide 45 MG kit    LUPRON DEPOT     SHOT EVERY 6 MONTHS        losartan 25 MG tablet    COZAAR     Take 25 mg by mouth        metFORMIN 500 MG tablet    GLUCOPHAGE     Take 500 mg by mouth        nitroGLYcerin 0.4 MG sublingual tablet    NITROSTAT     Place 0.4 mg under the tongue        tamsulosin 0.4 MG capsule    FLOMAX     Take 0.4 mg by mouth        warfarin 5 MG tablet    COUMADIN     2.5 mg daily   7.5 mg M-F  Monitored by irena BOLANOS

## 2018-01-16 ENCOUNTER — ALLIED HEALTH/NURSE VISIT (OUTPATIENT)
Dept: RADIATION ONCOLOGY | Facility: HOSPITAL | Age: 83
End: 2018-01-16

## 2018-01-16 DIAGNOSIS — C61 PROSTATE CANCER (H): Primary | ICD-10-CM

## 2018-01-16 PROCEDURE — 77385 ZZH IMRT TREATMENT DELIVERY, SIMPLE: CPT | Performed by: RADIOLOGY

## 2018-01-16 NOTE — PROGRESS NOTES
Chelsea Harden received radiation therapy treatment today 01/16/18.    Mary Avila  January 16, 2018  10:36 AM

## 2018-01-16 NOTE — MR AVS SNAPSHOT
After Visit Summary   1/16/2018    Chelsea Harden    MRN: [id number]           Patient Information     Date Of Birth          2/22/1934        Visit Information        Provider Department      1/16/2018 10:30 AM [facility]        Today's Diagnoses     Prostate cancer (H)    -  1       Follow-ups after your visit        Your next 10 appointments already scheduled     Jan 17, 2018 10:30 AM CST   Treatment with [facility]   [facility] Radiation Oncology ([facility] )    [address] [address]   [phone]            Jan 17, 2018 11:00 AM CST   on treatment visit with Emerson Costello MD   [facility] Radiation Oncology ([facility] )    [address] [address]   [phone]            Jan 18, 2018 10:30 AM CST   Treatment with [facility]   [facility] Radiation Oncology ([facility] )    [address] [address]   [phone]            Jan 19, 2018 10:30 AM CST   Treatment with [facility]   [facility] Radiation Oncology ([facility] )    [address] [address]   [phone]            Jan 22, 2018 10:30 AM CST   Treatment with [facility]   [facility] Radiation Oncology ([facility] )    [address] [address]   [phone]            Jan 23, 2018 10:30 AM CST   Treatment with [facility]   [facility] Radiation Oncology ([facility] )    [address] [address]   [phone]            Jan 24, 2018 10:30 AM CST   Treatment with [facility]   [facility] Radiation Oncology ([facility] )    [address] [address]   [phone]            Jan 24, 2018 11:00 AM CST   on treatment visit with Emerson Costello MD   [facility] Radiation Oncology ([facility] )    [address] [address]   [phone]            Jan 25, 2018 10:30 AM CST   Treatment with [facility]   HI Radiation  "Oncology ([facility] )    [address] [address] [phone]            2018 10:30 AM CST   Treatment with [facility]   [facility] Radiation Oncology ([facility] )    [address] [address] [phone]              Who to contact     If you have questions or need follow up information about today's clinic visit or your schedule please contact [facility] directly at [phone].  Normal or non-critical lab and imaging results will be communicated to you by MyChart, letter or phone within 4 business days after the clinic has received the results. If you do not hear from us within 7 days, please contact the clinic through Tipprhart or phone. If you have a critical or abnormal lab result, we will notify you by phone as soon as possible.  Submit refill requests through BoomBang or call your pharmacy and they will forward the refill request to us. Please allow 3 business days for your refill to be completed.          Additional Information About Your Visit        BoomBang Information     BoomBang lets you send messages to your doctor, view your test results, renew your prescriptions, schedule appointments and more. To sign up, go to [email or web address] . Click on \"Log in\" on the left side of the screen, which will take you to the Welcome page. Then click on \"Sign up Now\" on the right side of the page.     You will be asked to enter the access code listed below, as well as some personal information. Please follow the directions to create your username and password.     Your access code is: [email or web address]  Expires: 2018 11:17 AM     Your access code will  in 90 days. If you need help or a new code, please call your [place] clinic or [phone].        Care EveryWhere ID     This is your Care EveryWhere ID. This could be used by other organizations to access your [place] medical records  [id number]         Blood Pressure from " Last 3 Encounters:   01/10/18 102/64   01/03/18 108/64   12/27/17 124/72    Weight from Last 3 Encounters:   01/10/18 89.8 kg (198 lb)   01/03/18 93.4 kg (206 lb)   12/27/17 94.3 kg (208 lb)              Today, you had the following     No orders found for display       Primary Care Provider    None Specified       No primary provider on file.        Equal Access to Services     CRISTINA ZAMARRIPA : Hadii chio Lombardo, wasarahda carito, qajose carlosta kaalmada shravan, mayito rubinchancemariano sorensen . So [facility] [phone].    ATENCIÓN: Si jassonla raul, tiene a ramirez disposición servicios gratuitos de asistencia lingüística. Llame al [phone].    We comply with applicable federal civil rights laws and [place] laws. We do not discriminate on the basis of race, color, national origin, age, disability, sex, sexual orientation, or gender identity.            Thank you!     Thank you for choosing [facility]  for your care. Our goal is always to provide you with excellent care. Hearing back from our patients is one way we can continue to improve our services. Please take a few minutes to complete the written survey that you may receive in the mail after your visit with us. Thank you!             Your Updated Medication List - Protect others around you: Learn how to safely use, store and throw away your medicines at www.disposemymeds.org.          This list is accurate as of: 1/16/18 10:47 AM.  Always use your most recent med list.                   Brand Name Dispense Instructions for use Diagnosis    aspirin 81 MG chewable tablet      81 mg        atorvastatin 20 MG tablet    LIPITOR     Take 10 mg by mouth        busPIRone 10 MG tablet    BUSPAR     1 1/2 tabs twice daily        enzalutamide 40 MG capsule    XTANDI     Take 80 mg by mouth        fluticasone 50 MCG/ACT spray    FLONASE     Spray 1 spray in nostril        gabapentin 300 MG capsule    NEURONTIN     2 tablets morning, 2 afternoon, 3 at  bedtime.        HYDROcodone-acetaminophen 5-325 MG per tablet   Start taking on:  1/19/2018    NORCO     ONE  TABLET  TWICE PRN PAIN Limit acetaminophen to 4000 mg per day from all sources.        insulin glargine 100 UNIT/ML injection    LANTUS     Inject 30 units daily under the skin        ipratropium 17 MCG/ACT Inhaler    ATROVENT HFA     Inhale 2 puffs into the lungs        isosorbide mononitrate 30 MG 24 hr tablet    IMDUR     Take 30 mg by mouth        leuprolide 45 MG kit    LUPRON DEPOT     SHOT EVERY 6 MONTHS        losartan 25 MG tablet    COZAAR     Take 25 mg by mouth        metFORMIN 500 MG tablet    GLUCOPHAGE     Take 500 mg by mouth        nitroGLYcerin 0.4 MG sublingual tablet    NITROSTAT     Place 0.4 mg under the tongue        tamsulosin 0.4 MG capsule    FLOMAX     Take 0.4 mg by mouth        warfarin 5 MG tablet    COUMADIN     2.5 mg daily   7.5 mg M-F  Monitored by irena BOLANOS

## 2018-01-17 ENCOUNTER — ALLIED HEALTH/NURSE VISIT (OUTPATIENT)
Dept: RADIATION ONCOLOGY | Facility: HOSPITAL | Age: 83
End: 2018-01-17

## 2018-01-17 ENCOUNTER — OFFICE VISIT (OUTPATIENT)
Dept: RADIATION ONCOLOGY | Facility: HOSPITAL | Age: 83
End: 2018-01-17

## 2018-01-17 VITALS
WEIGHT: 198 LBS | SYSTOLIC BLOOD PRESSURE: 100 MMHG | HEART RATE: 64 BPM | BODY MASS INDEX: 30.11 KG/M2 | DIASTOLIC BLOOD PRESSURE: 68 MMHG | RESPIRATION RATE: 16 BRPM

## 2018-01-17 DIAGNOSIS — C61 PROSTATE CANCER (H): Primary | ICD-10-CM

## 2018-01-17 PROCEDURE — 77336 RADIATION PHYSICS CONSULT: CPT | Performed by: RADIOLOGY

## 2018-01-17 PROCEDURE — 77385 ZZH IMRT TREATMENT DELIVERY, SIMPLE: CPT | Performed by: RADIOLOGY

## 2018-01-17 ASSESSMENT — PAIN SCALES - GENERAL: PAINLEVEL: MODERATE PAIN (4)

## 2018-01-17 NOTE — PROGRESS NOTES
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       RADIATION RX:  John Mcmanus has received 3800 cGy to date for treatment of the above-described prostate carcinoma.      SUBJECTIVE:  Still doing well with his treatment.  He has had some respiratory problems recently exacerbated and is currently in a nursing home, but again no real problems related to his prostate carcinoma or treatment.      OBJECTIVE:  Weight 198.4 pounds.  Abdomen benign, bowel sounds present and active.      IMPRESSION:  Routine tolerance to radiation therapy for prostate carcinoma.      PLAN:  Continue treatment as planned.         DAJA BERNARDO MD             D: 2018 11:43   T: 2018 12:06   MT: MATT      Name:     JOHN MCMANUS   MRN:      [id number]        Account:      [id number]   :      1934           Service Date: 2018      Document: [id number]

## 2018-01-17 NOTE — MR AVS SNAPSHOT
After Visit Summary   1/17/2018    Chelsea Harden    MRN: [id number]           Patient Information     Date Of Birth          2/22/1934        Visit Information        Provider Department      1/17/2018 10:30 AM [facility] Rad[facility] On[facility]        Today's Diagnoses     Prostate cancer (H)    -  1       Follow-ups after your visit        Your next 10 appointments already scheduled     Jan 18, 2018 10:30 AM CST   Treatment with [facility]   [facility] Radiation Oncology ([facility] )    [address] [address]   [phone]            Jan 19, 2018 10:30 AM CST   Treatment with [facility]   [facility] Radiation Oncology ([facility] )    [address] [address]   [phone]            Jan 22, 2018 10:30 AM CST   Treatment with [facility]   [facility] Radiation Oncology ([facility] )    [address] [address]   [phone]            Jan 23, 2018 10:30 AM CST   Treatment with [facility]   [facility] Radiation Oncology ([facility] )    [address] [address]   [phone]            Jan 24, 2018 10:30 AM CST   Treatment with [facility]   [facility] Radiation Oncology ([facility] )    [address] [address]   [phone]            Jan 24, 2018 11:00 AM CST   on treatment visit with Emerson Costello MD   HI Radiation Oncology ([facility] )    [address] [address]   [phone]            Jan 25, 2018 10:30 AM CST   Treatment with [facility]   [facility] Radiation Oncology ([facility] )    [address] [address]   [phone]            Jan 26, 2018 10:30 AM CST   Treatment with [facility]   [facility] Radiation Oncology ([facility] )    [address] [address]   [phone]            Jan 29, 2018 10:30 AM CST   Treatment with [facility]   [facility] Radiation Oncology ([facility] )    [address] [id number] [phone]            2018 10:30 AM CST   Treatment with [facility] CL[facility] IX   HI Radiation Oncology ([facility] )    [address] [id number] [phone]              Who to contact     If you have questions or need follow up information about today's clinic visit or your schedule please contact [facility] directly at [phone].  Normal or non-critical lab and imaging results will be communicated to you by MyChart, letter or phone within 4 business days after the clinic has received the results. If you do not hear from us within 7 days, please contact the clinic through [employer] Financial[employer] or phone. If you have a critical or abnormal lab result, we will notify you by phone as soon as possible.  Submit refill requests through [employer] or call your pharmacy and they will forward the refill request to us. Please allow 3 business days for your refill to be completed.          Additional Information About Your Visit        [employer] Studio Information     [employer] Studio lets you send messages to your doctor, view your test results, renew your prescriptions, schedule appointments and more. To sign up, go to [email or web address] . Click on \"Log in\" on the left side of the screen, which will take you to the Welcome page. Then click on \"Sign up Now\" on the right side of the page.     You will be asked to enter the access code listed below, as well as some personal information. Please follow the directions to create your username and password.     Your access code is: [email or web address]  Expires: 2018 11:17 AM     Your access code will  in 90 days. If you need help or a new code, please call your [place] clinic or [phone].        Care EveryWhere ID     This is your Care EveryWhere ID. This could be used by other organizations to access your [place] medical records  [id number]         Blood Pressure from Last 3 " Encounters:   01/17/18 100/68   01/10/18 102/64   01/03/18 108/64    Weight from Last 3 Encounters:   01/17/18 89.8 kg (198 lb)   01/10/18 89.8 kg (198 lb)   01/03/18 93.4 kg (206 lb)              Today, you had the following     No orders found for display       Primary Care Provider    None Specified       No primary provider on file.        Equal Access to Services     [facility] : Hadii chio arguetao Solazarus, waaxda luqadaha, qaybta kaalmada shravan, myaito rubinchancemariano sorensen . So [facility] [phone].    ATENCIÓN: Si jose carter, tiene a ramirez disposición servicios gratuitos de asistencia lingüística. Llame al [phone].    We comply with applicable federal civil rights laws and [place] laws. We do not discriminate on the basis of race, color, national origin, age, disability, sex, sexual orientation, or gender identity.            Thank you!     Thank you for choosing [facility]  for your care. Our goal is always to provide you with excellent care. Hearing back from our patients is one way we can continue to improve our services. Please take a few minutes to complete the written survey that you may receive in the mail after your visit with us. Thank you!             Your Updated Medication List - Protect others around you: Learn how to safely use, store and throw away your medicines at www.disposemymeds.org.          This list is accurate as of: 1/17/18 11:32 AM.  Always use your most recent med list.                   Brand Name Dispense Instructions for use Diagnosis    aspirin 81 MG chewable tablet      81 mg        atorvastatin 20 MG tablet    LIPITOR     Take 10 mg by mouth        busPIRone 10 MG tablet    BUSPAR     1 1/2 tabs twice daily        enzalutamide 40 MG capsule    XTANDI     Take 80 mg by mouth        fluticasone 50 MCG/ACT spray    FLONASE     Spray 1 spray in nostril        gabapentin 300 MG capsule    NEURONTIN     2 tablets morning, 2 afternoon, 3 at  bedtime.        HYDROcodone-acetaminophen 5-325 MG per tablet   Start taking on:  1/19/2018    NORCO     ONE  TABLET  TWICE PRN PAIN Limit acetaminophen to 4000 mg per day from all sources.        insulin glargine 100 UNIT/ML injection    LANTUS     Inject 30 units daily under the skin        ipratropium 17 MCG/ACT Inhaler    ATROVENT HFA     Inhale 2 puffs into the lungs        isosorbide mononitrate 30 MG 24 hr tablet    IMDUR     Take 30 mg by mouth        leuprolide 45 MG kit    LUPRON DEPOT     SHOT EVERY 6 MONTHS        losartan 25 MG tablet    COZAAR     Take 25 mg by mouth        metFORMIN 500 MG tablet    GLUCOPHAGE     Take 500 mg by mouth        nitroGLYcerin 0.4 MG sublingual tablet    NITROSTAT     Place 0.4 mg under the tongue        tamsulosin 0.4 MG capsule    FLOMAX     Take 0.4 mg by mouth        warfarin 5 MG tablet    COUMADIN     2.5 mg daily   7.5 mg M-F  Monitored by irena BOLANOS

## 2018-01-17 NOTE — PROGRESS NOTES
Chelsea Harden received radiation therapy treatment today 01/17/18.    Josias Pizano  January 17, 2018  10:55 AM

## 2018-01-17 NOTE — MR AVS SNAPSHOT
After Visit Summary   1/17/2018    Chelsea Harden    MRN: [id number]           Patient Information     Date Of Birth          2/22/1934        Visit Information        Provider Department      1/17/2018 10:45 AM Emerson Costello MD [facility] Radiation Oncology        Today's Diagnoses     Prostate cancer (H)    -  1       Follow-ups after your visit        Your next 10 appointments already scheduled     Jan 18, 2018 10:30 AM CST   Treatment with [facility]   [facility] Radiation Oncology ([facility] )    [address] [address]   [phone]            Jan 19, 2018 10:30 AM CST   Treatment with HI CL[facility] IX   [facility] Radiation Oncology ([facility] )    [address] [address]   [phone]            Jan 22, 2018 10:30 AM CST   Treatment with HI CL[facility] IX   HI Radiation Oncology ([facility] )    [address] [address]   [phone]            Jan 23, 2018 10:30 AM CST   Treatment with HI CL[facility] IX   [facility] Radiation Oncology ([facility] )    [address] [address]   [phone]            Jan 24, 2018 10:30 AM CST   Treatment with HI CL[facility] IX   [facility] Radiation Oncology ([facility] )    [address] [address]   [phone]            Jan 24, 2018 11:00 AM CST   on treatment visit with Emerson Costello MD   HI Radiation Oncology ([facility] )    [address] [address]   [phone]            Jan 25, 2018 10:30 AM CST   Treatment with HI CL[facility] IX   [facility] Radiation Oncology ([facility] )    [address] [address]   [phone]            Jan 26, 2018 10:30 AM CST   Treatment with HI CLIN[facility] IX   [facility] Radiation Oncology ([facility] )    [address] [address]   [phone]            Jan 29, 2018 10:30 AM CST   Treatment with HI CLIN[facility] IX   HI Radiation Oncology  "([facility] )    [address] [address] [phone]            2018 10:30 AM CST   Treatment with HI CLIN[facility] IX   HI Radiation Oncology ([facility] )    [address] [address] [phone]              Who to contact     If you have questions or need follow up information about today's clinic visit or your schedule please contact [facility] directly at [phone].  Normal or non-critical lab and imaging results will be communicated to you by Outbound[employer]hart, letter or phone within 4 business days after the clinic has received the results. If you do not hear from us within 7 days, please contact the clinic through [employer] or phone. If you have a critical or abnormal lab result, we will notify you by phone as soon as possible.  Submit refill requests through Cle[employer] or call your pharmacy and they will forward the refill request to us. Please allow 3 business days for your refill to be completed.          Additional Information About Your Visit        Cleartrip Information     Cleartrip lets you send messages to your doctor, view your test results, renew your prescriptions, schedule appointments and more. To sign up, go to [email or web address] . Click on \"Log in\" on the left side of the screen, which will take you to the Welcome page. Then click on \"Sign up Now\" on the right side of the page.     You will be asked to enter the access code listed below, as well as some personal information. Please follow the directions to create your username and password.     Your access code is: [email or web address]  Expires: 2018 11:17 AM     Your access code will  in 90 days. If you need help or a new code, please call your [place] clinic or [phone].        Care EveryWhere ID     This is your Care EveryWhere ID. This could be used by other organizations to access your [place] medical records  [id number]        Your Vitals Were     Pulse " Respirations BMI (Body Mass Index)             64 16 30.11 kg/m2          Blood Pressure from Last 3 Encounters:   01/17/18 100/68   01/10/18 102/64   01/03/18 108/64    Weight from Last 3 Encounters:   01/17/18 89.8 kg (198 lb)   01/10/18 89.8 kg (198 lb)   01/03/18 93.4 kg (206 lb)              Today, you had the following     No orders found for display       Primary Care Provider    None Specified       No primary provider on file.        Equal Access to Services     CRISTINA ZAMARRIPA : Hadii chio arguetao Solazarus, waaxda luqadaha, qaybta kaalmada shravan, mayito sorensen . So [facility] [phone].    ATENCIÓN: Si habla español, tiene a ramirez disposición servicios gratuitos de asistencia lingüística. Llame al [phone].    We comply with applicable federal civil rights laws and [place] laws. We do not discriminate on the basis of race, color, national origin, age, disability, sex, sexual orientation, or gender identity.            Thank you!     Thank you for choosing [facility]  for your care. Our goal is always to provide you with excellent care. Hearing back from our patients is one way we can continue to improve our services. Please take a few minutes to complete the written survey that you may receive in the mail after your visit with us. Thank you!             Your Updated Medication List - Protect others around you: Learn how to safely use, store and throw away your medicines at www.disposemymeds.org.          This list is accurate as of: 1/17/18  2:49 PM.  Always use your most recent med list.                   Brand Name Dispense Instructions for use Diagnosis    aspirin 81 MG chewable tablet      81 mg        atorvastatin 20 MG tablet    LIPITOR     Take 10 mg by mouth        busPIRone 10 MG tablet    BUSPAR     1 1/2 tabs twice daily        enzalutamide 40 MG capsule    XTANDI     Take 80 mg by mouth        fluticasone 50 MCG/ACT spray    FLONASE     Spray 1 spray in  nostril        gabapentin 300 MG capsule    NEURONTIN     2 tablets morning, 2 afternoon, 3 at bedtime.        HYDROcodone-acetaminophen 5-325 MG per tablet   Start taking on:  1/19/2018    NORCO     ONE  TABLET  TWICE PRN PAIN Limit acetaminophen to 4000 mg per day from all sources.        insulin glargine 100 UNIT/ML injection    LANTUS     Inject 30 units daily under the skin        ipratropium 17 MCG/ACT Inhaler    ATROVENT HFA     Inhale 2 puffs into the lungs        isosorbide mononitrate 30 MG 24 hr tablet    IMDUR     Take 30 mg by mouth        leuprolide 45 MG kit    LUPRON DEPOT     SHOT EVERY 6 MONTHS        losartan 25 MG tablet    COZAAR     Take 25 mg by mouth        metFORMIN 500 MG tablet    GLUCOPHAGE     Take 500 mg by mouth        nitroGLYcerin 0.4 MG sublingual tablet    NITROSTAT     Place 0.4 mg under the tongue        tamsulosin 0.4 MG capsule    FLOMAX     Take 0.4 mg by mouth        warfarin 5 MG tablet    COUMADIN     2.5 mg daily   7.5 mg M-F  Monitored by irena BOLANOS

## 2018-01-18 ENCOUNTER — ALLIED HEALTH/NURSE VISIT (OUTPATIENT)
Dept: RADIATION ONCOLOGY | Facility: HOSPITAL | Age: 83
End: 2018-01-18

## 2018-01-18 DIAGNOSIS — C61 PROSTATE CANCER (H): Primary | ICD-10-CM

## 2018-01-18 PROCEDURE — 77385 ZZH IMRT TREATMENT DELIVERY, SIMPLE: CPT | Performed by: RADIOLOGY

## 2018-01-18 NOTE — PROGRESS NOTES
Chelsea Harden received radiation therapy treatment today 01/18/18.    Marcelo Payne  January 18, 2018  10:42 AM

## 2018-01-18 NOTE — MR AVS SNAPSHOT
After Visit Summary   1/18/2018    Chelsea Harden    MRN: [id number]           Patient Information     Date Of Birth          2/22/1934        Visit Information        Provider Department      1/18/2018 10:30 AM [facility]        Today's Diagnoses     Prostate cancer (H)    -  1       Follow-ups after your visit        Your next 10 appointments already scheduled     Jan 19, 2018 10:30 AM CST   Treatment with [facility]   [facility] Radiation Oncology ([facility] )    [address] [address]   [phone]            Jan 22, 2018 10:30 AM CST   Treatment with [facility]   [facility] Radiation Oncology ([facility] )    [address] [address]   [phone]            Jan 23, 2018 10:30 AM CST   Treatment with [facility]   [facility] Radiation Oncology ([facility] )    [address] [address]   [phone]            Jan 24, 2018 10:30 AM CST   Treatment with [facility]   [facility] Radiation Oncology ([facility] )    [address] [address]   [phone]            Jan 24, 2018 11:00 AM CST   on treatment visit with Emerson Costello MD   [facility] Radiation Oncology ([facility] )    [address] [address]   [phone]            Jan 25, 2018 10:30 AM CST   Treatment with [facility]   [facility] Radiation Oncology ([facility] )    [address] [address]   [phone]            Jan 26, 2018 10:30 AM CST   Treatment with [facility]   [facility] Radiation Oncology ([facility] )    [address] [address]   [phone]            Jan 29, 2018 10:30 AM CST   Treatment with [facility]   [facility] Radiation Oncology ([facility] )    [address] [address]   [phone]            Jan 30, 2018 10:30 AM CST   Treatment with [facility]   HI Radiation Oncology ([place]  [facility] )    [address] [address] [phone]            2018 10:30 AM CST   Treatment with HI CL[facility] IX   HI Radiation Oncology ([facility] )    [address] [address] [phone]              Who to contact     If you have questions or need follow up information about today's clinic visit or your schedule please contact [facility] directly at [phone].  Normal or non-critical lab and imaging results will be communicated to you by MyChart, letter or phone within 4 business days after the clinic has received the results. If you do not hear from us within 7 days, please contact the clinic through "[employer]hart or phone. If you have a critical or abnormal lab result, we will notify you by phone as soon as possible.  Submit refill requests through [employer] or call your pharmacy and they will forward the refill request to us. Please allow 3 business days for your refill to be completed.          Additional Information About Your Visit        [employer] Information     [employer] lets you send messages to your doctor, view your test results, renew your prescriptions, schedule appointments and more. To sign up, go to [email or web address] . Click on \"Log in\" on the left side of the screen, which will take you to the Welcome page. Then click on \"Sign up Now\" on the right side of the page.     You will be asked to enter the access code listed below, as well as some personal information. Please follow the directions to create your username and password.     Your access code is: [email or web address]  Expires: 2018 11:17 AM     Your access code will  in 90 days. If you need help or a new code, please call your [place] clinic or [phone].        Care EveryWhere ID     This is your Care EveryWhere ID. This could be used by other organizations to access your [place] medical records  [id number]         Blood Pressure from Last 3 " Encounters:   01/17/18 100/68   01/10/18 102/64   01/03/18 108/64    Weight from Last 3 Encounters:   01/17/18 89.8 kg (198 lb)   01/10/18 89.8 kg (198 lb)   01/03/18 93.4 kg (206 lb)              Today, you had the following     No orders found for display       Primary Care Provider    None Specified       No primary provider on file.        Equal Access to Services     [facility]EL [facility] : Hadii chio arguetao Solazarus, waaxda luqadaha, qaybta kaalmada shravan, mayito rubinchancemariano sorensen . So [facility] [phone].    ATENCIÓN: Si jose carter, tiene a ramirez disposición servicios gratuitos de asistencia lingüística. Llame al [phone].    We comply with applicable federal civil rights laws and [place] laws. We do not discriminate on the basis of race, color, national origin, age, disability, sex, sexual orientation, or gender identity.            Thank you!     Thank you for choosing [facility]  for your care. Our goal is always to provide you with excellent care. Hearing back from our patients is one way we can continue to improve our services. Please take a few minutes to complete the written survey that you may receive in the mail after your visit with us. Thank you!             Your Updated Medication List - Protect others around you: Learn how to safely use, store and throw away your medicines at www.disposemymeds.org.          This list is accurate as of: 1/18/18 10:53 AM.  Always use your most recent med list.                   Brand Name Dispense Instructions for use Diagnosis    aspirin 81 MG chewable tablet      81 mg        atorvastatin 20 MG tablet    LIPITOR     Take 10 mg by mouth        busPIRone 10 MG tablet    BUSPAR     1 1/2 tabs twice daily        enzalutamide 40 MG capsule    XTANDI     Take 80 mg by mouth        fluticasone 50 MCG/ACT spray    FLONASE     Spray 1 spray in nostril        gabapentin 300 MG capsule    NEURONTIN     2 tablets morning, 2 afternoon, 3 at  bedtime.        HYDROcodone-acetaminophen 5-325 MG per tablet   Start taking on:  1/19/2018    NORCO     ONE  TABLET  TWICE PRN PAIN Limit acetaminophen to 4000 mg per day from all sources.        insulin glargine 100 UNIT/ML injection    LANTUS     Inject 30 units daily under the skin        ipratropium 17 MCG/ACT Inhaler    ATROVENT HFA     Inhale 2 puffs into the lungs        isosorbide mononitrate 30 MG 24 hr tablet    IMDUR     Take 30 mg by mouth        leuprolide 45 MG kit    LUPRON DEPOT     SHOT EVERY 6 MONTHS        losartan 25 MG tablet    COZAAR     Take 25 mg by mouth        metFORMIN 500 MG tablet    GLUCOPHAGE     Take 500 mg by mouth        nitroGLYcerin 0.4 MG sublingual tablet    NITROSTAT     Place 0.4 mg under the tongue        tamsulosin 0.4 MG capsule    FLOMAX     Take 0.4 mg by mouth        warfarin 5 MG tablet    COUMADIN     2.5 mg daily   7.5 mg M-F  Monitored by irena BOLANOS

## 2018-01-19 ENCOUNTER — ALLIED HEALTH/NURSE VISIT (OUTPATIENT)
Dept: RADIATION ONCOLOGY | Facility: HOSPITAL | Age: 83
End: 2018-01-19

## 2018-01-19 DIAGNOSIS — C61 PROSTATE CANCER (H): Primary | ICD-10-CM

## 2018-01-19 PROCEDURE — 77385 ZZH IMRT TREATMENT DELIVERY, SIMPLE: CPT | Performed by: RADIOLOGY

## 2018-01-19 NOTE — PROGRESS NOTES
Chelsea Harden received radiation therapy treatment today 01/19/18.    Mary Avila  January 19, 2018  10:32 AM

## 2018-01-19 NOTE — MR AVS SNAPSHOT
After Visit Summary   1/19/2018    Chelsea Harden    MRN: [id number]           Patient Information     Date Of Birth          2/22/1934        Visit Information        Provider Department      1/19/2018 10:30 AM [facility] Radiation Oncology        Today's Diagnoses     Prostate cancer (H)    -  1       Follow-ups after your visit        Your next 10 appointments already scheduled     Jan 22, 2018 10:30 AM CST   Treatment with [facility]   HI Radiation Oncology ([facility] )    [address] [address]   [phone]            Jan 23, 2018 10:30 AM CST   Treatment with [facility]   [facility] Radiation Oncology ([facility] )    [address] [address]   [phone]            Jan 24, 2018 10:30 AM CST   Treatment with [facility] CL[facility]   [facility] Radiation Oncology ([facility] )    [address] [address]   [phone]            Jan 24, 2018 11:00 AM CST   on treatment visit with Emerson Costello MD   [facility] Radiation Oncology ([facility] )    [address] [address]   [phone]            Jan 25, 2018 10:30 AM CST   Treatment with [facility] CL[facility] IX   HI Radiation Oncology ([facility] )    [address] [address]   [phone]            Jan 26, 2018 10:30 AM CST   Treatment with [facility] CL[facility] IX   [facility] Radiation Oncology ([facility] )    [address] [address]   [phone]            Jan 29, 2018 10:30 AM CST   Treatment with [facility] CL[facility] IX   [facility] Radiation Oncology ([facility] )    [address] [address]   [phone]            Jan 30, 2018 10:30 AM CST   Treatment with [facility] CL[facility] IX   [facility] Radiation Oncology ([facility] )    [address] [address]   [phone]            Jan 31, 2018 10:30 AM CST   Treatment with [facility] CL[facility] IX   HI Radiation Oncology ([place]  [facility] )    [address] [address] [phone]            2018 11:00 AM CST   on treatment visit with Emerson Costello MD   HI Radiation Oncology ([facility] )    [address] [address] [phone]              Who to contact     If you have questions or need follow up information about today's clinic visit or your schedule please contact [facility] directly at [phone].  Normal or non-critical lab and imaging results will be communicated to you by MyChart, letter or phone within 4 business days after the clinic has received the results. If you do not hear from us within 7 days, please contact the clinic through [employer] or phone. If you have a critical or abnormal lab result, we will notify you by phone as soon as possible.  Submit refill requests through [email or web address] or call your pharmacy and they will forward the refill request to us. Please allow 3 business days for your refill to be completed.          Additional Information About Your Visit        [employer] Information     [email or web address] lets you send messages to your doctor, view your test results, renew your prescriptions, schedule appointments and more. To sign up, go to [email or web address] . Click on \"Log in\" on the left side of the screen, which will take you to the Welcome page. Then click on \"Sign up Now\" on the right side of the page.     You will be asked to enter the access code listed below, as well as some personal information. Please follow the directions to create your username and password.     Your access code is: [email or web address]  Expires: 2018 11:17 AM     Your access code will  in 90 days. If you need help or a new code, please call your [place] clinic or [phone].        Care EveryWhere ID     This is your Care EveryWhere ID. This could be used by other organizations to access your [place] medical records  [id number]         Blood Pressure from " Last 3 Encounters:   01/17/18 100/68   01/10/18 102/64   01/03/18 108/64    Weight from Last 3 Encounters:   01/17/18 89.8 kg (198 lb)   01/10/18 89.8 kg (198 lb)   01/03/18 93.4 kg (206 lb)              Today, you had the following     No orders found for display       Primary Care Provider    None Specified       No primary provider on file.        Equal Access to Services     CRISTINA ZAMARRIPA : Hadii chio Lombardo, wasarahda alexandroadaha, qajose carlosta kaalmada shravan, mayito rubinchancemariano sorensen . So [facility] [phone].    ATENCIÓN: Si jassonla raul, tiene a ramirez disposición servicios gratuitos de asistencia lingüística. Llame al [phone].    We comply with applicable federal civil rights laws and [place] laws. We do not discriminate on the basis of race, color, national origin, age, disability, sex, sexual orientation, or gender identity.            Thank you!     Thank you for choosing [facility]  for your care. Our goal is always to provide you with excellent care. Hearing back from our patients is one way we can continue to improve our services. Please take a few minutes to complete the written survey that you may receive in the mail after your visit with us. Thank you!             Your Updated Medication List - Protect others around you: Learn how to safely use, store and throw away your medicines at www.disposemymeds.org.          This list is accurate as of: 1/19/18 10:58 AM.  Always use your most recent med list.                   Brand Name Dispense Instructions for use Diagnosis    aspirin 81 MG chewable tablet      81 mg        atorvastatin 20 MG tablet    LIPITOR     Take 10 mg by mouth        busPIRone 10 MG tablet    BUSPAR     1 1/2 tabs twice daily        enzalutamide 40 MG capsule    XTANDI     Take 80 mg by mouth        fluticasone 50 MCG/ACT spray    FLONASE     Spray 1 spray in nostril        gabapentin 300 MG capsule    NEURONTIN     2 tablets morning, 2 afternoon, 3 at  bedtime.        HYDROcodone-acetaminophen 5-325 MG per tablet    NORCO     ONE  TABLET  TWICE PRN PAIN Limit acetaminophen to 4000 mg per day from all sources.        insulin glargine 100 UNIT/ML injection    LANTUS     Inject 30 units daily under the skin        ipratropium 17 MCG/ACT Inhaler    ATROVENT HFA     Inhale 2 puffs into the lungs        isosorbide mononitrate 30 MG 24 hr tablet    IMDUR     Take 30 mg by mouth        leuprolide 45 MG kit    LUPRON DEPOT     SHOT EVERY 6 MONTHS        losartan 25 MG tablet    COZAAR     Take 25 mg by mouth        metFORMIN 500 MG tablet    GLUCOPHAGE     Take 500 mg by mouth        nitroGLYcerin 0.4 MG sublingual tablet    NITROSTAT     Place 0.4 mg under the tongue        tamsulosin 0.4 MG capsule    FLOMAX     Take 0.4 mg by mouth        warfarin 5 MG tablet    COUMADIN     2.5 mg daily   7.5 mg M-F  Monitored by irena BOLANOS

## 2018-01-22 ENCOUNTER — ALLIED HEALTH/NURSE VISIT (OUTPATIENT)
Dept: RADIATION ONCOLOGY | Facility: HOSPITAL | Age: 83
End: 2018-01-22

## 2018-01-22 DIAGNOSIS — C61 PROSTATE CANCER (H): Primary | ICD-10-CM

## 2018-01-22 PROCEDURE — 77385 ZZH IMRT TREATMENT DELIVERY, SIMPLE: CPT | Performed by: RADIOLOGY

## 2018-01-22 NOTE — PROGRESS NOTES
Chelsea Harden received radiation therapy treatment today 01/22/18.    Rochelle Ortiz  January 22, 2018  10:45 AM

## 2018-01-22 NOTE — MR AVS SNAPSHOT
After Visit Summary   1/22/2018    Chelsea Harden    MRN: [id number]           Patient Information     Date Of Birth          2/22/1934        Visit Information        Provider Department      1/22/2018 10:30 AM [facility] Radiation Oncology        Today's Diagnoses     Prostate cancer (H)    -  1       Follow-ups after your visit        Your next 10 appointments already scheduled     Jan 23, 2018 10:30 AM CST   Treatment with HI CL[facility] IX   HI Radiation Oncology ([facility] )    [address] [address]   [phone]            Jan 24, 2018 10:30 AM CST   Treatment with HI CL[facility] IX   HI Radiation Oncology ([facility] )    [address] [address]   [phone]            Jan 24, 2018 11:00 AM CST   on treatment visit with Emerson Costello MD   [facility] Radiation Oncology ([facility] )    [address] [address]   [phone]            Jan 25, 2018 10:30 AM CST   Treatment with HI CL[facility] IX   [facility] Radiation Oncology ([facility] )    [address] [address]   [phone]            Jan 26, 2018 10:30 AM CST   Treatment with HI CL[facility] IX   HI Radiation Oncology ([facility] )    [address] [address]   [phone]            Jan 29, 2018 10:30 AM CST   Treatment with HI CLIN[facility] IX   HI Radiation Oncology ([facility] )    [address] [address]   [phone]            Jan 30, 2018 10:30 AM CST   Treatment with HI CLIN[facility] IX   [facility] Radiation Oncology ([facility] )    [address] [address]   [phone]            Jan 31, 2018 10:30 AM CST   Treatment with HI CLIN[facility] IX   HI Radiation Oncology ([facility] )    [address] [address]   [phone]            Jan 31, 2018 11:00 AM CST   on treatment visit with Emerson Costello MD   HI Radiation  "Oncology ([facility] )    [address] [id number] [phone]            2018 10:30 AM CST   Treatment with HI CLIN[facility] IX   HI Radiation Oncology ([facility] )    [address] [address] [phone]              Who to contact     If you have questions or need follow up information about today's clinic visit or your schedule please contact [facility] directly at [phone].  Normal or non-critical lab and imaging results will be communicated to you by MyChart, letter or phone within 4 business days after the clinic has received the results. If you do not hear from us within 7 days, please contact the clinic through [employer] or phone. If you have a critical or abnormal lab result, we will notify you by phone as soon as possible.  Submit refill requests through [employer] or call your pharmacy and they will forward the refill request to us. Please allow 3 business days for your refill to be completed.          Additional Information About Your Visit        [employer] Information     [employer] lets you send messages to your doctor, view your test results, renew your prescriptions, schedule appointments and more. To sign up, go to [email or web address] . Click on \"Log in\" on the left side of the screen, which will take you to the Welcome page. Then click on \"Sign up Now\" on the right side of the page.     You will be asked to enter the access code listed below, as well as some personal information. Please follow the directions to create your username and password.     Your access code is: [email or web address]  Expires: 2018 11:17 AM     Your access code will  in 90 days. If you need help or a new code, please call your [place] clinic or [phone].        Care EveryWhere ID     This is your Care EveryWhere ID. This could be used by other organizations to access your [place] medical records  [id number]         Blood Pressure from " Last 3 Encounters:   01/17/18 100/68   01/10/18 102/64   01/03/18 108/64    Weight from Last 3 Encounters:   01/17/18 89.8 kg (198 lb)   01/10/18 89.8 kg (198 lb)   01/03/18 93.4 kg (206 lb)              Today, you had the following     No orders found for display       Primary Care Provider    None Specified       No primary provider on file.        Equal Access to Services     CRISTINA ZAMARRIPA : Hadii chio Lombardo, wasarahda alexandroadaha, qajose carlosta kaalmada shravan, mayito rubinchancemariano sorensen . So [facility] [phone].    ATENCIÓN: Si jassonla raul, tiene a ramirez disposición servicios gratuitos de asistencia lingüística. Llame al [phone].    We comply with applicable federal civil rights laws and [place] laws. We do not discriminate on the basis of race, color, national origin, age, disability, sex, sexual orientation, or gender identity.            Thank you!     Thank you for choosing [facility]  for your care. Our goal is always to provide you with excellent care. Hearing back from our patients is one way we can continue to improve our services. Please take a few minutes to complete the written survey that you may receive in the mail after your visit with us. Thank you!             Your Updated Medication List - Protect others around you: Learn how to safely use, store and throw away your medicines at www.disposemymeds.org.          This list is accurate as of: 1/22/18 10:48 AM.  Always use your most recent med list.                   Brand Name Dispense Instructions for use Diagnosis    aspirin 81 MG chewable tablet      81 mg        atorvastatin 20 MG tablet    LIPITOR     Take 10 mg by mouth        busPIRone 10 MG tablet    BUSPAR     1 1/2 tabs twice daily        enzalutamide 40 MG capsule    XTANDI     Take 80 mg by mouth        fluticasone 50 MCG/ACT spray    FLONASE     Spray 1 spray in nostril        gabapentin 300 MG capsule    NEURONTIN     2 tablets morning, 2 afternoon, 3 at  bedtime.        HYDROcodone-acetaminophen 5-325 MG per tablet    NORCO     ONE  TABLET  TWICE PRN PAIN Limit acetaminophen to 4000 mg per day from all sources.        insulin glargine 100 UNIT/ML injection    LANTUS     Inject 30 units daily under the skin        ipratropium 17 MCG/ACT Inhaler    ATROVENT HFA     Inhale 2 puffs into the lungs        isosorbide mononitrate 30 MG 24 hr tablet    IMDUR     Take 30 mg by mouth        leuprolide 45 MG kit    LUPRON DEPOT     SHOT EVERY 6 MONTHS        losartan 25 MG tablet    COZAAR     Take 25 mg by mouth        metFORMIN 500 MG tablet    GLUCOPHAGE     Take 500 mg by mouth        nitroGLYcerin 0.4 MG sublingual tablet    NITROSTAT     Place 0.4 mg under the tongue        tamsulosin 0.4 MG capsule    FLOMAX     Take 0.4 mg by mouth        warfarin 5 MG tablet    COUMADIN     2.5 mg daily   7.5 mg M-F  Monitored by irena BOLANOS

## 2018-01-23 ENCOUNTER — ALLIED HEALTH/NURSE VISIT (OUTPATIENT)
Dept: RADIATION ONCOLOGY | Facility: HOSPITAL | Age: 83
End: 2018-01-23

## 2018-01-23 DIAGNOSIS — C61 PROSTATE CANCER (H): Primary | ICD-10-CM

## 2018-01-23 PROCEDURE — 77385 ZZH IMRT TREATMENT DELIVERY, SIMPLE: CPT | Performed by: RADIOLOGY

## 2018-01-23 NOTE — PROGRESS NOTES
Chelsea Harden received radiation therapy treatment today 01/23/18.    Mary Avila  January 23, 2018  10:30 AM

## 2018-01-23 NOTE — MR AVS SNAPSHOT
After Visit Summary   1/23/2018    Chelsea Harden    MRN: [id number]           Patient Information     Date Of Birth          2/22/1934        Visit Information        Provider Department      1/23/2018 10:30 AM [facility]        Today's Diagnoses     Prostate cancer (H)    -  1       Follow-ups after your visit        Your next 10 appointments already scheduled     Jan 24, 2018 10:30 AM CST   Treatment with [facility]   [facility] Rad[facility] Oncology ([facility] )    [address] [address]   [phone]            Jan 24, 2018 10:45 AM CST   on treatment visit with Emerson Costello MD   [facility] Radiation Oncology ([facility] )    [address] [address]   [phone]            Jan 25, 2018 10:30 AM CST   Treatment with [facility]   [facility] Radiation Oncology ([facility] )    [address] [address]   [phone]            Jan 26, 2018 10:30 AM CST   Treatment with [facility]   [facility] ([facility] )    [address] [address]   [phone]            Jan 29, 2018 10:30 AM CST   Treatment with [facility]   [facility] ([facility] )    [address] [address]   [phone]            Jan 30, 2018 10:30 AM CST   Treatment with [facility]   [facility] Oncology ([facility] )    [address] [address]   [phone]            Jan 31, 2018 10:30 AM CST   Treatment with [facility]   [facility] ([facility] )    [address] [address]   [phone]            Jan 31, 2018 11:00 AM CST   on treatment visit with Emerson Costello MD   [facility] Radiation [facility]cology ([facility] )    [address] [address]   [phone]            Feb 01, 2018 10:30 AM CST   Treatment with [facility]   HI Radiation  "Oncology ([facility] )    [address] [address] [phone]            2018 10:30 AM CST   Treatment with [facility]   [facility] Radiation Oncology ([facility] )    [address] [address] [phone]              Who to contact     If you have questions or need follow up information about today's clinic visit or your schedule please contact [facility] directly at [phone].  Normal or non-critical lab and imaging results will be communicated to you by eEyehart, letter or phone within 4 business days after the clinic has received the results. If you do not hear from us within 7 days, please contact the clinic through eEyehart or phone. If you have a critical or abnormal lab result, we will notify you by phone as soon as possible.  Submit refill requests through [employer] or call your pharmacy and they will forward the refill request to us. Please allow 3 business days for your refill to be completed.          Additional Information About Your Visit        [employer] Information     [employer] lets you send messages to your doctor, view your test results, renew your prescriptions, schedule appointments and more. To sign up, go to [email or web address] . Click on \"Log in\" on the left side of the screen, which will take you to the Welcome page. Then click on \"Sign up Now\" on the right side of the page.     You will be asked to enter the access code listed below, as well as some personal information. Please follow the directions to create your username and password.     Your access code is: [email or web address]  Expires: 2018 11:17 AM     Your access code will  in 90 days. If you need help or a new code, please call your [place] clinic or [phone].        Care EveryWhere ID     This is your Care EveryWhere ID. This could be used by other organizations to access your [place] medical records  [id number]         Blood Pressure from " Last 3 Encounters:   01/17/18 100/68   01/10/18 102/64   01/03/18 108/64    Weight from Last 3 Encounters:   01/17/18 89.8 kg (198 lb)   01/10/18 89.8 kg (198 lb)   01/03/18 93.4 kg (206 lb)              Today, you had the following     No orders found for display       Primary Care Provider    None Specified       No primary provider on file.        Equal Access to Services     CRISTINA ZAMARRIPA : Hadii chio Lombardo, wasarahda alexandroadaha, qajose carlosta kaalmada shravan, mayito rubinchancemariano sorensen . So [facility] [phone].    ATENCIÓN: Si jassonla raul, tiene a ramirez disposición servicios gratuitos de asistencia lingüística. Llame al [phone].    We comply with applicable federal civil rights laws and [place] laws. We do not discriminate on the basis of race, color, national origin, age, disability, sex, sexual orientation, or gender identity.            Thank you!     Thank you for choosing [facility]  for your care. Our goal is always to provide you with excellent care. Hearing back from our patients is one way we can continue to improve our services. Please take a few minutes to complete the written survey that you may receive in the mail after your visit with us. Thank you!             Your Updated Medication List - Protect others around you: Learn how to safely use, store and throw away your medicines at www.disposemymeds.org.          This list is accurate as of: 1/23/18 10:40 AM.  Always use your most recent med list.                   Brand Name Dispense Instructions for use Diagnosis    aspirin 81 MG chewable tablet      81 mg        atorvastatin 20 MG tablet    LIPITOR     Take 10 mg by mouth        busPIRone 10 MG tablet    BUSPAR     1 1/2 tabs twice daily        enzalutamide 40 MG capsule    XTANDI     Take 80 mg by mouth        fluticasone 50 MCG/ACT spray    FLONASE     Spray 1 spray in nostril        gabapentin 300 MG capsule    NEURONTIN     2 tablets morning, 2 afternoon, 3 at  bedtime.        HYDROcodone-acetaminophen 5-325 MG per tablet    NORCO     ONE  TABLET  TWICE PRN PAIN Limit acetaminophen to 4000 mg per day from all sources.        insulin glargine 100 UNIT/ML injection    LANTUS     Inject 30 units daily under the skin        ipratropium 17 MCG/ACT Inhaler    ATROVENT HFA     Inhale 2 puffs into the lungs        isosorbide mononitrate 30 MG 24 hr tablet    IMDUR     Take 30 mg by mouth        leuprolide 45 MG kit    LUPRON DEPOT     SHOT EVERY 6 MONTHS        losartan 25 MG tablet    COZAAR     Take 25 mg by mouth        metFORMIN 500 MG tablet    GLUCOPHAGE     Take 500 mg by mouth        nitroGLYcerin 0.4 MG sublingual tablet    NITROSTAT     Place 0.4 mg under the tongue        tamsulosin 0.4 MG capsule    FLOMAX     Take 0.4 mg by mouth        warfarin 5 MG tablet    COUMADIN     2.5 mg daily   7.5 mg M-F  Monitored by irena BOLANOS

## 2018-01-24 ENCOUNTER — OFFICE VISIT (OUTPATIENT)
Dept: RADIATION ONCOLOGY | Facility: HOSPITAL | Age: 83
End: 2018-01-24

## 2018-01-24 ENCOUNTER — ALLIED HEALTH/NURSE VISIT (OUTPATIENT)
Dept: RADIATION ONCOLOGY | Facility: HOSPITAL | Age: 83
End: 2018-01-24

## 2018-01-24 VITALS
WEIGHT: 202 LBS | RESPIRATION RATE: 20 BRPM | BODY MASS INDEX: 30.71 KG/M2 | SYSTOLIC BLOOD PRESSURE: 128 MMHG | HEART RATE: 60 BPM | DIASTOLIC BLOOD PRESSURE: 68 MMHG

## 2018-01-24 DIAGNOSIS — C61 PROSTATE CANCER (H): Primary | ICD-10-CM

## 2018-01-24 PROCEDURE — 77336 RADIATION PHYSICS CONSULT: CPT | Performed by: RADIOLOGY

## 2018-01-24 PROCEDURE — 77385 ZZH IMRT TREATMENT DELIVERY, SIMPLE: CPT | Performed by: RADIOLOGY

## 2018-01-24 ASSESSMENT — PAIN SCALES - GENERAL: PAINLEVEL: MILD PAIN (3)

## 2018-01-24 NOTE — PROGRESS NOTES
Chelsea Harden received radiation therapy treatment today 01/24/18.    Mary Avila  January 24, 2018  10:26 AM

## 2018-01-24 NOTE — PROGRESS NOTES
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  Currently responding to enzalutamide.      RADIATION RX:  John Mcmanus has received 4750 cGy to date for treatment of the above-described prostate carcinoma.      SUBJECTIVE:  He is still doing fairly well with regard to his treatment.  He is apparently being discharged from the nursing home tomorrow and has [facility] follow [facility] I believe today.  No ongoing problems related to his prostate carcinoma or his treatment.      OBJECTIVE:     GENERAL:  Weight 202 pounds.     ABDOMEN:  Benign.  Bowel sounds present.      IMPRESSION:  Routine tolerance to radiation therapy for prostate carcinoma.      PLAN:  Continue treatment as planned.         DAJA BERNARDO MD             D: 2018   T: 2018   MT: RENNY      Name:     JOHN MCMANUS   MRN:      [id number]        Account:      [id number]   :      1934           Service Date: 2018      Document: [id number]

## 2018-01-24 NOTE — MR AVS SNAPSHOT
After Visit Summary   1/24/2018    Chelsea Harden    MRN: [id number]           Patient Information     Date Of Birth          2/22/1934        Visit Information        Provider Department      1/24/2018 10:30 AM [facility]        Today's Diagnoses     Prostate cancer (H)    -  1       Follow-ups after your visit        Your next 10 appointments already scheduled     Jan 24, 2018 10:45 AM CST   on treatment visit with Emerson Costello MD   [facility] Radiation Oncology ([facility] )    [address] [address]   [phone]            Jan 25, 2018 10:30 AM CST   Treatment with [facility]   [facility] Radiation Oncology ([facility] )    [address] [address]   [phone]            Jan 26, 2018 10:30 AM CST   Treatment with [facility]   [facility] Radiation Oncology ([facility] )    [address] [address]   [phone]            Jan 29, 2018 10:30 AM CST   Treatment with [facility]   [facility] Radiation Oncology ([facility] )    [address] [address]   [phone]            Jan 30, 2018 10:30 AM CST   Treatment with [facility]   [facility] Radiation Oncology ([facility] )    [address] [address]   [phone]            Jan 31, 2018 10:30 AM CST   Treatment with [facility]   [facility] Radiation Oncology ([facility] )    [address] [address]   [phone]            Jan 31, 2018 11:00 AM CST   on treatment visit with Emerson Costello MD   [facility] Radiation Oncology ([facility] )    [address] [address]   [phone]            Feb 01, 2018 10:30 AM CST   Treatment with [facility]   [facility] Radiation Oncology ([facility] )    [address] [address]   [phone]            Feb 02, 2018 10:30 AM CST   Treatment with [facility]   HI Radiation  "Oncology ([facility] )    [address] [address] [phone]            2018 10:30 AM CST   Treatment with [facility]   [facility] Radiation Oncology ([facility] )    [address] [address] [phone]              Who to contact     If you have questions or need follow up information about today's clinic visit or your schedule please contact [facility] directly at [phone].  Normal or non-critical lab and imaging results will be communicated to you by [employer], letter or phone within 4 business days after the clinic has received the results. If you do not hear from us within 7 days, please contact the clinic through [employer] or phone. If you have a critical or abnormal lab result, we will notify you by phone as soon as possible.  Submit refill requests through [employer] or call your pharmacy and they will forward the refill request to us. Please allow 3 business days for your refill to be completed.          Additional Information About Your Visit        [employer] Information     [employer] lets you send messages to your doctor, view your test results, renew your prescriptions, schedule appointments and more. To sign up, go to [email or web address] . Click on \"Log in\" on the left side of the screen, which will take you to the Welcome page. Then click on \"Sign up Now\" on the right side of the page.     You will be asked to enter the access code listed below, as well as some personal information. Please follow the directions to create your username and password.     Your access code is: [email or web address]  Expires: 2018 11:17 AM     Your access code will  in 90 days. If you need help or a new code, please call your [employer] clinic or [phone].        Care EveryWhere ID     This is your Care EveryWhere ID. This could be used by other organizations to access your [employer] medical records  [id number]         Blood Pressure from " Last 3 Encounters:   01/17/18 100/68   01/10/18 102/64   01/03/18 108/64    Weight from Last 3 Encounters:   01/17/18 89.8 kg (198 lb)   01/10/18 89.8 kg (198 lb)   01/03/18 93.4 kg (206 lb)              Today, you had the following     No orders found for display       Primary Care Provider    None Specified       No primary provider on file.        Equal Access to Services     CRISTINA ZAMARRIPA : Hadii chio Lombardo, wasarahda lufletcheradaha, qaybta kaalmada shravan, mayito rubinchancemariano sorensen . So [facility] [phone].    ATENCIÓN: Si jassonla raul, tiene a ramirez disposición servicios gratuitos de asistencia lingüística. Llame al [phone].    We comply with applicable federal civil rights laws and [place] laws. We do not discriminate on the basis of race, color, national origin, age, disability, sex, sexual orientation, or gender identity.            Thank you!     Thank you for choosing [facility]  for your care. Our goal is always to provide you with excellent care. Hearing back from our patients is one way we can continue to improve our services. Please take a few minutes to complete the written survey that you may receive in the mail after your visit with us. Thank you!             Your Updated Medication List - Protect others around you: Learn how to safely use, store and throw away your medicines at www.disposemymeds.org.          This list is accurate as of 1/24/18 10:32 AM.  Always use your most recent med list.                   Brand Name Dispense Instructions for use Diagnosis    aspirin 81 MG chewable tablet      81 mg        atorvastatin 20 MG tablet    LIPITOR     Take 10 mg by mouth        busPIRone 10 MG tablet    BUSPAR     1 1/2 tabs twice daily        enzalutamide 40 MG capsule    XTANDI     Take 80 mg by mouth        fluticasone 50 MCG/ACT spray    FLONASE     Spray 1 spray in nostril        gabapentin 300 MG capsule    NEURONTIN     2 tablets morning, 2 afternoon, 3 at  bedtime.        HYDROcodone-acetaminophen 5-325 MG per tablet    NORCO     ONE  TABLET  TWICE PRN PAIN Limit acetaminophen to 4000 mg per day from all sources.        insulin glargine 100 UNIT/ML injection    LANTUS     Inject 30 units daily under the skin        ipratropium 17 MCG/ACT Inhaler    ATROVENT HFA     Inhale 2 puffs into the lungs        isosorbide mononitrate 30 MG 24 hr tablet    IMDUR     Take 30 mg by mouth        leuprolide 45 MG kit    LUPRON DEPOT     SHOT EVERY 6 MONTHS        losartan 25 MG tablet    COZAAR     Take 25 mg by mouth        metFORMIN 500 MG tablet    GLUCOPHAGE     Take 500 mg by mouth        nitroGLYcerin 0.4 MG sublingual tablet    NITROSTAT     Place 0.4 mg under the tongue        tamsulosin 0.4 MG capsule    FLOMAX     Take 0.4 mg by mouth        warfarin 5 MG tablet    COUMADIN     2.5 mg daily   7.5 mg M-F  Monitored by irena BOLANOS

## 2018-01-24 NOTE — MR AVS SNAPSHOT
After Visit Summary   1/24/2018    Chelsea Harden    MRN: [id number]           Patient Information     Date Of Birth          2/22/1934        Visit Information        Provider Department      1/24/2018 10:45 AM Emerson Costello MD [facility]        Today's Diagnoses     Prostate cancer (H)    -  1       Follow-ups after your visit        Your next 10 appointments already scheduled     Jan 25, 2018 10:30 AM CST   Treatment with [facility]   [facility] Radiation Oncology ([facility] )    [address] [address]   [phone]            Jan 26, 2018 10:30 AM CST   Treatment with [facility]   [facility] Radiation Oncology ([facility] )    [address] [address]   [phone]            Jan 29, 2018 10:30 AM CST   Treatment with [facility]   [facility] Radiation Oncology ([facility] )    [address] [address]   [phone]            Jan 30, 2018 10:30 AM CST   Treatment with [facility]   [facility] Radiation Oncology ([facility] )    [address] [address]   [phone]            Jan 31, 2018 10:30 AM CST   Treatment with [facility]   [facility] Radiation Oncology ([facility] )    [address] [address]   [phone]            Jan 31, 2018 11:00 AM CST   on treatment visit with Emerson Costello MD   [facility] Radiation Oncology ([facility] )    [address] [address]   [phone]            Feb 01, 2018 10:30 AM CST   Treatment with [facility]   [facility] Radiation Oncology ([facility] )    [address] [address]   [phone]            Feb 02, 2018 10:30 AM CST   Treatment with [facility] CL[facility] IX   [facility] Radiation Oncology ([facility] )    [address] [address]   [phone]            Feb 05, 2018 10:30 AM CST   Treatment with [facility] CL[facility] IX   HI Radiation Oncology  "([facility] )    [address] [address] [phone]            2018 10:30 AM CST   Treatment with HI CL[facility] IX   HI Radiation Oncology ([facility] )    [address] [address] [phone]              Who to contact     If you have questions or need follow up information about today's clinic visit or your schedule please contact [facility] directly at [phone].  Normal or non-critical lab and imaging results will be communicated to you by MyChart, letter or phone within 4 business days after the clinic has received the results. If you do not hear from us within 7 days, please contact the clinic through [employer]iohart or phone. If you have a critical or abnormal lab result, we will notify you by phone as soon as possible.  Submit refill requests through [employer] or call your pharmacy and they will forward the refill request to us. Please allow 3 business days for your refill to be completed.          Additional Information About Your Visit        [employer] Information     [employer]ights lets you send messages to your doctor, view your test results, renew your prescriptions, schedule appointments and more. To sign up, go to [email or web address] . Click on \"Log in\" on the left side of the screen, which will take you to the Welcome page. Then click on \"Sign up Now\" on the right side of the page.     You will be asked to enter the access code listed below, as well as some personal information. Please follow the directions to create your username and password.     Your access code is: [email or web address]  Expires: 2018 11:17 AM     Your access code will  in 90 days. If you need help or a new code, please call your [place] clinic or [phone].        Care EveryWhere ID     This is your Care EveryWhere ID. This could be used by other organizations to access your [place] medical records  [id number]        Your Vitals Were     Pulse " Respirations BMI (Body Mass Index)             60 20 30.71 kg/m2          Blood Pressure from Last 3 Encounters:   01/24/18 128/68   01/17/18 100/68   01/10/18 102/64    Weight from Last 3 Encounters:   01/24/18 91.6 kg (202 lb)   01/17/18 89.8 kg (198 lb)   01/10/18 89.8 kg (198 lb)              Today, you had the following     No orders found for display       Primary Care Provider    None Specified       No primary provider on file.        Equal Access to Services     CRISTINA ZAMARRIPA : Hadii chio arguetao Solazarus, waaxda luqadaha, qaybta kaalmada shravan, mayito sorensen . So [facility] [place] [phone].    ATENCIÓN: Si habla español, tiene a ramirez disposición servicios gratuitos de asistencia lingüística. Llame al [phone].    We comply with applicable federal civil rights laws and [place] laws. We do not discriminate on the basis of race, color, national origin, age, disability, sex, sexual orientation, or gender identity.            Thank you!     Thank you for choosing [facility]  for your care. Our goal is always to provide you with excellent care. Hearing back from our patients is one way we can continue to improve our services. Please take a few minutes to complete the written survey that you may receive in the mail after your visit with us. Thank you!             Your Updated Medication List - Protect others around you: Learn how to safely use, store and throw away your medicines at www.disposemymeds.org.          This list is accurate as of 1/24/18  3:23 PM.  Always use your most recent med list.                   Brand Name Dispense Instructions for use Diagnosis    aspirin 81 MG chewable tablet      81 mg        atorvastatin 20 MG tablet    LIPITOR     Take 10 mg by mouth        busPIRone 10 MG tablet    BUSPAR     1 1/2 tabs twice daily        enzalutamide 40 MG capsule    XTANDI     Take 80 mg by mouth        fluticasone 50 MCG/ACT spray    FLONASE     Spray 1 spray in  nostril        gabapentin 300 MG capsule    NEURONTIN     2 tablets morning, 2 afternoon, 3 at bedtime.        HYDROcodone-acetaminophen 5-325 MG per tablet    NORCO     ONE  TABLET  TWICE PRN PAIN Limit acetaminophen to 4000 mg per day from all sources.        insulin glargine 100 UNIT/ML injection    LANTUS     Inject 30 units daily under the skin        ipratropium 17 MCG/ACT Inhaler    ATROVENT HFA     Inhale 2 puffs into the lungs        isosorbide mononitrate 30 MG 24 hr tablet    IMDUR     Take 30 mg by mouth        leuprolide 45 MG kit    LUPRON DEPOT     SHOT EVERY 6 MONTHS        losartan 25 MG tablet    COZAAR     Take 25 mg by mouth        metFORMIN 500 MG tablet    GLUCOPHAGE     Take 500 mg by mouth        nitroGLYcerin 0.4 MG sublingual tablet    NITROSTAT     Place 0.4 mg under the tongue        tamsulosin 0.4 MG capsule    FLOMAX     Take 0.4 mg by mouth        warfarin 5 MG tablet    COUMADIN     2.5 mg daily   7.5 mg M-F  Monitored by irena BOLANOS

## 2018-01-25 ENCOUNTER — ALLIED HEALTH/NURSE VISIT (OUTPATIENT)
Dept: RADIATION ONCOLOGY | Facility: HOSPITAL | Age: 83
End: 2018-01-25

## 2018-01-25 DIAGNOSIS — C61 PROSTATE CANCER (H): Primary | ICD-10-CM

## 2018-01-25 PROCEDURE — 77385 ZZH IMRT TREATMENT DELIVERY, SIMPLE: CPT | Performed by: RADIOLOGY

## 2018-01-25 NOTE — MR AVS SNAPSHOT
After Visit Summary   1/25/2018    Chelsea Harden    MRN: [id number]           Patient Information     Date Of Birth          2/22/1934        Visit Information        Provider Department      1/25/2018 10:30 AM [facility]        Today's Diagnoses     Prostate cancer (H)    -  1       Follow-ups after your visit        Your next 10 appointments already scheduled     Jan 26, 2018 10:30 AM CST   Treatment with [facility]   [facility] Radiation Oncology ([facility] )    [address] [address]   [phone]            Jan 29, 2018 10:30 AM CST   Treatment with [facility]   [facility] Radiation Oncology ([facility] )    [address] [address]   [phone]            Jan 30, 2018 10:30 AM CST   Treatment with [facility]   [facility] Radiation Oncology ([facility] )    [address] [address]   [phone]            Jan 31, 2018 10:30 AM CST   Treatment with [facility]   [facility] Radiation Oncology ([facility] )    [address] [address]   [phone]            Jan 31, 2018 11:00 AM CST   on treatment visit with Emerson Costello MD   [facility] Radiation Oncology ([facility] )    [address] [address]   [phone]            Feb 01, 2018 10:30 AM CST   Treatment with [facility]   [facility] Radiation Oncology ([facility] )    [address] [address]   [phone]            Feb 02, 2018 10:30 AM CST   Treatment with [facility]   [facility] Radiation Oncology ([facility] )    [address] [address]   [phone]            Feb 05, 2018 10:30 AM CST   Treatment with [facility]   [facility] Radiation Oncology ([facility] )    [address] [address]   [phone]            Feb 06, 2018 10:30 AM CST   Treatment with [facility] CL[facility] IX   [facility] Radiation Oncology ([place]  [facility] )    [address] [address] [phone]            2018 10:30 AM CST   Treatment with [facility] CL[facility] IX   HI Radiation Oncology ([facility] )    [address] [address] [phone]              Who to contact     If you have questions or need follow up information about today's clinic visit or your schedule please contact [facility] directly at [phone].  Normal or non-critical lab and imaging results will be communicated to you by [employer], letter or phone within 4 business days after the clinic has received the results. If you do not hear from us within 7 days, please contact the clinic through [employer] or phone. If you have a critical or abnormal lab result, we will notify you by phone as soon as possible.  Submit refill requests through [employer] or call your pharmacy and they will forward the refill request to us. Please allow 3 business days for your refill to be completed.          Additional Information About Your Visit        [employer] Information     [employer] lets you send messages to your doctor, view your test results, renew your prescriptions, schedule appointments and more. To sign up, go to [email or web address] . Click on \"Log in\" on the left side of the screen, which will take you to the Welcome page. Then click on \"Sign up Now\" on the right side of the page.     You will be asked to enter the access code listed below, as well as some personal information. Please follow the directions to create your username and password.     Your access code is: [email or web address]  Expires: 2018 11:17 AM     Your access code will  in 90 days. If you need help or a new code, please call your [place] clinic or [phone].        Care EveryWhere ID     This is your Care EveryWhere ID. This could be used by other organizations to access your [facility] medical records  [id number]         Blood Pressure from Last 3 " Encounters:   01/24/18 128/68   01/17/18 100/68   01/10/18 102/64    Weight from Last 3 Encounters:   01/24/18 91.6 kg (202 lb)   01/17/18 89.8 kg (198 lb)   01/10/18 89.8 kg (198 lb)              Today, you had the following     No orders found for display       Primary Care Provider    None Specified       No primary provider on file.        Equal Access to Services     [facility] : Hadii chio ku ellieo Solazarus, waaxda luqadaha, qaybta kaalmada fletcherda, mayito rubinchancemariano sorensen . So [facility] [phone].    ATENCIÓN: Si jose carter, tiene a ramirez disposición servicios gratuitos de asistencia lingüística. Llame al [phone].    We comply with applicable federal civil rights laws and [place] laws. We do not discriminate on the basis of race, color, national origin, age, disability, sex, sexual orientation, or gender identity.            Thank you!     Thank you for choosing [facility]  for your care. Our goal is always to provide you with excellent care. Hearing back from our patients is one way we can continue to improve our services. Please take a few minutes to complete the written survey that you may receive in the mail after your visit with us. Thank you!             Your Updated Medication List - Protect others around you: Learn how to safely use, store and throw away your medicines at www.disposemymeds.org.          This list is accurate as of 1/25/18 11:01 AM.  Always use your most recent med list.                   Brand Name Dispense Instructions for use Diagnosis    aspirin 81 MG chewable tablet      81 mg        atorvastatin 20 MG tablet    LIPITOR     Take 10 mg by mouth        busPIRone 10 MG tablet    BUSPAR     1 1/2 tabs twice daily        enzalutamide 40 MG capsule    XTANDI     Take 80 mg by mouth        fluticasone 50 MCG/ACT spray    FLONASE     Spray 1 spray in nostril        gabapentin 300 MG capsule    NEURONTIN     2 tablets morning, 2 afternoon, 3 at  bedtime.        HYDROcodone-acetaminophen 5-325 MG per tablet    NORCO     ONE  TABLET  TWICE PRN PAIN Limit acetaminophen to 4000 mg per day from all sources.        insulin glargine 100 UNIT/ML injection    LANTUS     Inject 30 units daily under the skin        ipratropium 17 MCG/ACT Inhaler    ATROVENT HFA     Inhale 2 puffs into the lungs        isosorbide mononitrate 30 MG 24 hr tablet    IMDUR     Take 30 mg by mouth        leuprolide 45 MG kit    LUPRON DEPOT     SHOT EVERY 6 MONTHS        losartan 25 MG tablet    COZAAR     Take 25 mg by mouth        metFORMIN 500 MG tablet    GLUCOPHAGE     Take 500 mg by mouth        nitroGLYcerin 0.4 MG sublingual tablet    NITROSTAT     Place 0.4 mg under the tongue        tamsulosin 0.4 MG capsule    FLOMAX     Take 0.4 mg by mouth        warfarin 5 MG tablet    COUMADIN     2.5 mg daily   7.5 mg M-F  Monitored by irena BOLANOS

## 2018-01-25 NOTE — PROGRESS NOTES
Chelsea Harden received radiation therapy treatment today 01/25/18.    Mary Avila  January 25, 2018  10:38 AM

## 2018-01-26 ENCOUNTER — ALLIED HEALTH/NURSE VISIT (OUTPATIENT)
Dept: RADIATION ONCOLOGY | Facility: HOSPITAL | Age: 83
End: 2018-01-26

## 2018-01-26 DIAGNOSIS — C61 PROSTATE CANCER (H): Primary | ICD-10-CM

## 2018-01-26 PROCEDURE — 77385 ZZH IMRT TREATMENT DELIVERY, SIMPLE: CPT | Performed by: RADIOLOGY

## 2018-01-26 NOTE — PROGRESS NOTES
Chelsea Harden received radiation therapy treatment today 01/26/18.    Mary Avila  January 26, 2018  10:21 AM

## 2018-01-26 NOTE — MR AVS SNAPSHOT
After Visit Summary   1/26/2018    Chelsea Harden    MRN: [id number]           Patient Information     Date Of Birth          2/22/1934        Visit Information        Provider Department      1/26/2018 10:30 AM [facility] Rad[facility] On[facility]        Today's Diagnoses     Prostate cancer (H)    -  1       Follow-ups after your visit        Your next 10 appointments already scheduled     Jan 29, 2018 10:30 AM CST   Treatment with [facility] IX   HI Radiation Oncology ([facility] )    [address] [address]   [phone]            Jan 30, 2018 10:30 AM CST   Treatment with [facility] IX   [facility] Radiation Oncology ([facility] )    [address] [address]   [phone]            Jan 31, 2018 10:30 AM CST   Treatment with [facility]   [facility] Radiation Oncology ([facility] )    [address] [address]   [phone]            Jan 31, 2018 11:00 AM CST   on treatment visit with Emerson Costello MD   [facility] Radiation Oncology ([facility] )    [address] [address]   [phone]            Feb 01, 2018 10:30 AM CST   Treatment with [facility] IX   [facility] Radiation Oncology ([facility] )    [address] [address]   [phone]            Feb 02, 2018 10:30 AM CST   Treatment with [facility] CL[facility] IX   [facility] Radiation Oncology ([facility] )    [address] [address]   [phone]            Feb 05, 2018 10:30 AM CST   Treatment with [facility]   [facility] Radiation Oncology ([facility] )    [address] [address]   [phone]            Feb 06, 2018 10:30 AM CST   Treatment with [facility] CL[facility] IX   [facility] Radiation Oncology ([facility] )    [address] [address]   [phone]            Feb 07, 2018 10:30 AM CST   Treatment with [facility] CL[facility] IX   HI Radiation Oncology ([place]  [facility] )    [address] [address] [phone]            2018 11:00 AM CST   on treatment visit with Emerson Costello MD   [facility] Radiation Oncology ([facility] )    [address] [place] [address] [phone]              Who to contact     If you have questions or need follow up information about today's clinic visit or your schedule please contact [facility] directly at [phone].  Normal or non-critical lab and imaging results will be communicated to you by MyChart, letter or phone within 4 business days after the clinic has received the results. If you do not hear from us within 7 days, please contact the clinic through [employer] or phone. If you have a critical or abnormal lab result, we will notify you by phone as soon as possible.  Submit refill requests through [employer] or call your pharmacy and they will forward the refill request to us. Please allow 3 business days for your refill to be completed.          Additional Information About Your Visit        [employer] Information     [employer] lets you send messages to your doctor, view your test results, renew your prescriptions, schedule appointments and more. To sign up, go to [email or web address] . Click on \"Log in\" on the left side of the screen, which will take you to the Welcome page. Then click on \"Sign up Now\" on the right side of the page.     You will be asked to enter the access code listed below, as well as some personal information. Please follow the directions to create your username and password.     Your access code is: [email or web address]  Expires: 2018 11:17 AM     Your access code will  in 90 days. If you need help or a new code, please call your [facility] clinic or [phone].        Care EveryWhere ID     This is your Care EveryWhere ID. This could be used by other organizations to access your [facility] medical records  [id number]         Blood Pressure from " Last 3 Encounters:   01/24/18 128/68   01/17/18 100/68   01/10/18 102/64    Weight from Last 3 Encounters:   01/24/18 91.6 kg (202 lb)   01/17/18 89.8 kg (198 lb)   01/10/18 89.8 kg (198 lb)              Today, you had the following     No orders found for display       Primary Care Provider    None Specified       No primary provider on file.        Equal Access to Services     CRISTINA ZAMARRIPA : Hadii chio arguetao Solazarus, waaxda lufletcheradaha, qaybta kaalmada ademartiyada, mayito rubinchancemariano sorensen . So [facility] [phone].    ATENCIÓN: Si habla esptommy, tiene a ramirez disposición servicios gratuitos de asistencia lingüística. Llame al [phone].    We comply with applicable federal civil rights laws and [place] laws. We do not discriminate on the basis of race, color, national origin, age, disability, sex, sexual orientation, or gender identity.            Thank you!     Thank you for choosing [facility]  for your care. Our goal is always to provide you with excellent care. Hearing back from our patients is one way we can continue to improve our services. Please take a few minutes to complete the written survey that you may receive in the mail after your visit with us. Thank you!             Your Updated Medication List - Protect others around you: Learn how to safely use, store and throw away your medicines at www.disposemymeds.org.          This list is accurate as of 1/26/18 10:30 AM.  Always use your most recent med list.                   Brand Name Dispense Instructions for use Diagnosis    aspirin 81 MG chewable tablet      81 mg        atorvastatin 20 MG tablet    LIPITOR     Take 10 mg by mouth        busPIRone 10 MG tablet    BUSPAR     1 1/2 tabs twice daily        enzalutamide 40 MG capsule    XTANDI     Take 80 mg by mouth        gabapentin 300 MG capsule    NEURONTIN     2 tablets morning, 2 afternoon, 3 at bedtime.        HYDROcodone-acetaminophen 5-325 MG per tablet    NORCO      ONE  TABLET  TWICE PRN PAIN Limit acetaminophen to 4000 mg per day from all sources.        insulin glargine 100 UNIT/ML injection    LANTUS     Inject 30 units daily under the skin        ipratropium 17 MCG/ACT Inhaler    ATROVENT HFA     Inhale 2 puffs into the lungs        isosorbide mononitrate 30 MG 24 hr tablet    IMDUR     Take 30 mg by mouth        leuprolide 45 MG kit    LUPRON DEPOT     SHOT EVERY 6 MONTHS        losartan 25 MG tablet    COZAAR     Take 25 mg by mouth        metFORMIN 500 MG tablet    GLUCOPHAGE     Take 500 mg by mouth        nitroGLYcerin 0.4 MG sublingual tablet    NITROSTAT     Place 0.4 mg under the tongue        tamsulosin 0.4 MG capsule    FLOMAX     Take 0.4 mg by mouth        warfarin 5 MG tablet    COUMADIN     2.5 mg daily   7.5 mg M-F  Monitored by irena BOLANOS

## 2018-01-29 ENCOUNTER — ALLIED HEALTH/NURSE VISIT (OUTPATIENT)
Dept: RADIATION ONCOLOGY | Facility: HOSPITAL | Age: 83
End: 2018-01-29

## 2018-01-29 DIAGNOSIS — C61 PROSTATE CANCER (H): Primary | ICD-10-CM

## 2018-01-29 PROCEDURE — 77385 ZZH IMRT TREATMENT DELIVERY, SIMPLE: CPT | Performed by: RADIOLOGY

## 2018-01-29 NOTE — MR AVS SNAPSHOT
After Visit Summary   1/29/2018    Chelsea Harden    MRN: [id number]           Patient Information     Date Of Birth          2/22/1934        Visit Information        Provider Department      1/29/2018 10:30 AM [facility] Rad[facility] On[facility]        Today's Diagnoses     Prostate cancer (H)    -  1       Follow-ups after your visit        Your next 10 appointments already scheduled     Jan 31, 2018 10:30 AM CST   Treatment with [facility] CL[facility] IX   [facility] Radiation Oncology ([facility] )    [address] [address]   [phone]            Jan 31, 2018 11:00 AM CST   on treatment visit with Emerson Costello MD   [facility] Radiation Oncology ([facility] )    [address] [address]   [phone]            Feb 01, 2018 10:30 AM CST   Treatment with HI CL[facility] IX   [facility] Radiation Oncology ([facility] )    [address] [address]   [phone]            Feb 02, 2018 10:30 AM CST   Treatment with [facility] IX   [facility] Radiation Oncology ([facility] )    [address] [address]   [phone]            Feb 05, 2018 10:30 AM CST   Treatment with HI CL[facility] IX   [facility] Radiation Oncology ([facility] )    [address] [address]   [phone]            Feb 06, 2018 10:30 AM CST   Treatment with HI CL[facility] IX   [facility] Radiation Oncology ([facility] )    [address] [address]   [phone]            Feb 07, 2018 10:30 AM CST   Treatment with HI CL[facility] IX   [facility] Radiation Oncology ([facility] )    [address] [address]   [phone]            Feb 07, 2018 11:00 AM CST   on treatment visit with Emerson Costello MD   [facility] Radiation Oncology ([facility] )    [address] [address]   [phone]            Feb 08, 2018 10:30 AM CST   Treatment with [facility] CL[facility] IX   HI Radiation  "Oncology ([facility] )    [address] [address] [phone]            2018 10:30 AM CST   Treatment with HI CLIN[facility] IX   HI Radiation Oncology ([facility] )    [address] [address] [phone]              Who to contact     If you have questions or need follow up information about today's clinic visit or your schedule please contact [facility] directly at [phone].  Normal or non-critical lab and imaging results will be communicated to you by MyChart, letter or phone within 4 business days after the clinic has received the results. If you do not hear from us within 7 days, please contact the clinic through [employer] or phone. If you have a critical or abnormal lab result, we will notify you by phone as soon as possible.  Submit refill requests through [employer] or call your pharmacy and they will forward the refill request to us. Please allow 3 business days for your refill to be completed.          Additional Information About Your Visit        [employer] Information     [employer] lets you send messages to your doctor, view your test results, renew your prescriptions, schedule appointments and more. To sign up, go to [email or web address] . Click on \"Log in\" on the left side of the screen, which will take you to the Welcome page. Then click on \"Sign up Now\" on the right side of the page.     You will be asked to enter the access code listed below, as well as some personal information. Please follow the directions to create your username and password.     Your access code is: [email or web address]  Expires: 2018 11:17 AM     Your access code will  in 90 days. If you need help or a new code, please call your [facility] clinic or [phone].        Care EveryWhere ID     This is your Care EveryWhere ID. This could be used by other organizations to access your [facility] medical records  [id number]         Blood Pressure from " Last 3 Encounters:   01/24/18 128/68   01/17/18 100/68   01/10/18 102/64    Weight from Last 3 Encounters:   01/24/18 91.6 kg (202 lb)   01/17/18 89.8 kg (198 lb)   01/10/18 89.8 kg (198 lb)              Today, you had the following     No orders found for display       Primary Care Provider    None Specified       No primary provider on file.        Equal Access to Services     CRISTINA ZAMARRIPA : Hadii chio arguetao Solazarus, waaxda lufletcheradaha, qaybta kaalmada ademartiyada, mayito rubinchancemariano sorensen . So [facility] [phone].    ATENCIÓN: Si habla esptommy, tiene a ramirez disposición servicios gratuitos de asistencia lingüística. Llame al [phone].    We comply with applicable federal civil rights laws and [place] laws. We do not discriminate on the basis of race, color, national origin, age, disability, sex, sexual orientation, or gender identity.            Thank you!     Thank you for choosing [facility]  for your care. Our goal is always to provide you with excellent care. Hearing back from our patients is one way we can continue to improve our services. Please take a few minutes to complete the written survey that you may receive in the mail after your visit with us. Thank you!             Your Updated Medication List - Protect others around you: Learn how to safely use, store and throw away your medicines at www.disposemymeds.org.          This list is accurate as of 1/29/18 10:51 AM.  Always use your most recent med list.                   Brand Name Dispense Instructions for use Diagnosis    aspirin 81 MG chewable tablet      81 mg        atorvastatin 20 MG tablet    LIPITOR     Take 10 mg by mouth        busPIRone 10 MG tablet    BUSPAR     1 1/2 tabs twice daily        enzalutamide 40 MG capsule    XTANDI     Take 80 mg by mouth        gabapentin 300 MG capsule    NEURONTIN     2 tablets morning, 2 afternoon, 3 at bedtime.        HYDROcodone-acetaminophen 5-325 MG per tablet    NORCO      ONE  TABLET  TWICE PRN PAIN Limit acetaminophen to 4000 mg per day from all sources.        insulin glargine 100 UNIT/ML injection    LANTUS     Inject 30 units daily under the skin        ipratropium 17 MCG/ACT Inhaler    ATROVENT HFA     Inhale 2 puffs into the lungs        isosorbide mononitrate 30 MG 24 hr tablet    IMDUR     Take 30 mg by mouth        leuprolide 45 MG kit    LUPRON DEPOT     SHOT EVERY 6 MONTHS        losartan 25 MG tablet    COZAAR     Take 25 mg by mouth        metFORMIN 500 MG tablet    GLUCOPHAGE     Take 500 mg by mouth        nitroGLYcerin 0.4 MG sublingual tablet    NITROSTAT     Place 0.4 mg under the tongue        tamsulosin 0.4 MG capsule    FLOMAX     Take 0.4 mg by mouth        warfarin 5 MG tablet    COUMADIN     2.5 mg daily   7.5 mg M-F  Monitored by irena BOLANOS

## 2018-01-29 NOTE — PROGRESS NOTES
Chelsea Harden received radiation therapy treatment today 01/29/18.    Rochelle Ortiz  January 29, 2018  10:44 AM

## 2018-01-31 ENCOUNTER — OFFICE VISIT (OUTPATIENT)
Dept: RADIATION ONCOLOGY | Facility: HOSPITAL | Age: 83
End: 2018-01-31

## 2018-01-31 ENCOUNTER — ALLIED HEALTH/NURSE VISIT (OUTPATIENT)
Dept: RADIATION ONCOLOGY | Facility: HOSPITAL | Age: 83
End: 2018-01-31

## 2018-01-31 VITALS
SYSTOLIC BLOOD PRESSURE: 130 MMHG | DIASTOLIC BLOOD PRESSURE: 80 MMHG | BODY MASS INDEX: 31.25 KG/M2 | RESPIRATION RATE: 16 BRPM | HEART RATE: 60 BPM | WEIGHT: 205.5 LBS

## 2018-01-31 DIAGNOSIS — C61 PROSTATE CANCER (H): Primary | ICD-10-CM

## 2018-01-31 PROCEDURE — 77385 ZZH IMRT TREATMENT DELIVERY, SIMPLE: CPT | Performed by: RADIOLOGY

## 2018-01-31 ASSESSMENT — PAIN SCALES - GENERAL: PAINLEVEL: MILD PAIN (3)

## 2018-01-31 NOTE — PROGRESS NOTES
Chelsea Harden received radiation therapy treatment today 01/31/18.    Rochelle Ortiz  January 31, 2018  10:47 AM

## 2018-01-31 NOTE — PROGRESS NOTES
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      RADIATION RX:  John Mcmanus has received 5700 cGy to date for treatment of the above-described prostate carcinoma.      SUBJECTIVE:  He is still doing fairly well with regard to his treatment.  He was in a nursing home and has been discharged now.  He has had, I believe, Cardiology followup at [facility].  Again, in regard to his prostate carcinoma treatment he is doing quite well.  Not much change in GI or  symptomatology.      OBJECTIVE:     VITAL SIGNS:   Weight 205.5 pounds.     ABDOMEN:  Benign.  Bowel sounds present.      IMPRESSION:  Routine tolerance to radiation therapy for prostate carcinoma.      PLAN:  Continue treatment as planned.         DAJA BERNARDO MD             D: 2018   T: 2018   MT: RENNY      Name:     JOHN MCMANUS   MRN:      [id number]        Account:      [id number]   :      1934           Service Date: 2018      Document: [id number]

## 2018-01-31 NOTE — MR AVS SNAPSHOT
After Visit Summary   1/31/2018    Chelsea Harden    MRN: [id number]           Patient Information     Date Of Birth          2/22/1934        Visit Information        Provider Department      1/31/2018 10:45 AM Emerson Costello MD [facility]        Today's Diagnoses     Prostate cancer (H)    -  1       Follow-ups after your visit        Your next 10 appointments already scheduled     Feb 01, 2018 10:30 AM CST   Treatment with [facility]   [facility] ([facility] )    [address] [address]   [phone]            Feb 02, 2018 10:30 AM CST   Treatment with [facility]   [facility] ([facility] )    [address] [address]   [phone]            Feb 05, 2018 10:30 AM CST   Treatment with [facility]   [facility] ([facility] )    [address] [address]   [phone]            Feb 06, 2018 10:30 AM CST   Treatment with [facility]   [facility] ([facility] )    [address] [address]   [phone]            Feb 07, 2018 10:30 AM CST   Treatment with [facility]   [facility] ([facility] )    [address] [address]   [phone]            Feb 07, 2018 11:00 AM CST   on treatment visit with Emerson Costello MD   [facility] ([facility] )    [address] [address]   [phone]            Feb 08, 2018 10:30 AM CST   Treatment with [facility]   [facility] ([facility] )    [address] [address]   [phone]            Feb 09, 2018 10:30 AM CST   Treatment with [facility]   [facility] ([facility] )    [address] [address]   [phone]            Feb 12, 2018 10:30 AM CST   Treatment with [facility]   [facility]  "([facility] )    [address] [address] [phone]            2018 10:30 AM CST   Treatment with [facility]   [facility] ([facility] )    [address] [address] [phone]              Who to contact     If you have questions or need follow up information about today's clinic visit or your schedule please contact [facility] directly at [phone].  Normal or non-critical lab and imaging results will be communicated to you by MyChart, letter or phone within 4 business days after the clinic has received the results. If you do not hear from us within 7 days, please contact the clinic through [employer] or phone. If you have a critical or abnormal lab result, we will notify you by phone as soon as possible.  Submit refill requests through [employer] or call your pharmacy and they will forward the refill request to us. Please allow 3 business days for your refill to be completed.          Additional Information About Your Visit        [employer] Information     [employer] lets you send messages to your doctor, view your test results, renew your prescriptions, schedule appointments and more. To sign up, go to [email or web address] . Click on \"Log in\" on the left side of the screen, which will take you to the Welcome page. Then click on \"Sign up Now\" on the right side of the page.     You will be asked to enter the access code listed below, as well as some personal information. Please follow the directions to create your username and password.     Your access code is: [email or web address]  Expires: 2018 11:17 AM     Your access code will  in 90 days. If you need help or a new code, please call your [place] clinic or [phone].        Care EveryWhere ID     This is your Care EveryWhere ID. This could be used by other organizations to access your [place] medical records  [id number]        Your Vitals Were     Pulse " Respirations BMI (Body Mass Index)             60 16 31.25 kg/m2          Blood Pressure from Last 3 Encounters:   01/31/18 130/80   01/24/18 128/68   01/17/18 100/68    Weight from Last 3 Encounters:   01/31/18 93.2 kg (205 lb 8 oz)   01/24/18 91.6 kg (202 lb)   01/17/18 89.8 kg (198 lb)              Today, you had the following     No orders found for display       Primary Care Provider    None Specified       No primary provider on file.        Equal Access to Services     CRISTINA ZAMARRIAP : Hadii chio arguetao Soomaali, wasarahda luqadaha, qaybta kaalmada shravan, mayito sorensen . So [facility] [phone].    ATENCIÓN: Si habla español, tiene a ramirez disposición servicios gratuitos de asistencia lingüística. Llame al [phone].    We comply with applicable federal civil rights laws and [place] laws. We do not discriminate on the basis of race, color, national origin, age, disability, sex, sexual orientation, or gender identity.            Thank you!     Thank you for choosing [facility]  for your care. Our goal is always to provide you with excellent care. Hearing back from our patients is one way we can continue to improve our services. Please take a few minutes to complete the written survey that you may receive in the mail after your visit with us. Thank you!             Your Updated Medication List - Protect others around you: Learn how to safely use, store and throw away your medicines at www.disposemymeds.org.          This list is accurate as of 1/31/18  2:01 PM.  Always use your most recent med list.                   Brand Name Dispense Instructions for use Diagnosis    aspirin 81 MG chewable tablet      81 mg        atorvastatin 20 MG tablet    LIPITOR     Take 10 mg by mouth        busPIRone 10 MG tablet    BUSPAR     1 1/2 tabs twice daily        enzalutamide 40 MG capsule    XTANDI     Take 80 mg by mouth        gabapentin 300 MG capsule    NEURONTIN     2 tablets  morning, 2 afternoon, 3 at bedtime.        HYDROcodone-acetaminophen 5-325 MG per tablet    NORCO     ONE  TABLET  TWICE PRN PAIN Limit acetaminophen to 4000 mg per day from all sources.        insulin glargine 100 UNIT/ML injection    LANTUS     Inject 30 units daily under the skin        ipratropium 17 MCG/ACT Inhaler    ATROVENT HFA     Inhale 2 puffs into the lungs        isosorbide mononitrate 30 MG 24 hr tablet    IMDUR     Take 30 mg by mouth        leuprolide 45 MG kit    LUPRON DEPOT     SHOT EVERY 6 MONTHS        losartan 25 MG tablet    COZAAR     Take 25 mg by mouth        metFORMIN 500 MG tablet    GLUCOPHAGE     Take 500 mg by mouth        nitroGLYcerin 0.4 MG sublingual tablet    NITROSTAT     Place 0.4 mg under the tongue        tamsulosin 0.4 MG capsule    FLOMAX     Take 0.4 mg by mouth        Walker Misc      Front wheeled walker        warfarin 5 MG tablet    COUMADIN     2.5 mg daily   7.5 mg M-F  Monitored by irena BOLANOS

## 2018-01-31 NOTE — MR AVS SNAPSHOT
After Visit Summary   1/31/2018    Chelsea Harden    MRN: [id number]           Patient Information     Date Of Birth          2/22/1934        Visit Information        Provider Department      1/31/2018 10:30 AM [facility]        Today's Diagnoses     Prostate cancer (H)    -  1       Follow-ups after your visit        Your next 10 appointments already scheduled     Feb 01, 2018 10:30 AM CST   Treatment with [facility]   [facility] ([facility] )    [address] [address]   [phone]            Feb 02, 2018 10:30 AM CST   Treatment with [facility]   [facility] ([facility] )    [address] [address]   [phone]            Feb 05, 2018 10:30 AM CST   Treatment with [facility]   [facility] ([facility] )    [address] [address]   [phone]            Feb 06, 2018 10:30 AM CST   Treatment with [facility]   [facility] ([facility] )    [address] [address]   [phone]            Feb 07, 2018 10:30 AM CST   Treatment with [facility]   [facility] ([facility] )    [address] [address]   [phone]            Feb 07, 2018 11:00 AM CST   on treatment visit with Emerson Costello MD   [facility] ([facility] )    [address] [address]   [phone]            Feb 08, 2018 10:30 AM CST   Treatment with [facility]   [facility] ([facility] )    [address] [address]   [phone]            Feb 09, 2018 10:30 AM CST   Treatment with [facility]   [facility] ([facility] )    [address] [address]   [phone]            Feb 12, 2018 10:30 AM CST   Treatment with [facility]   [facility] ([place]  [facility] )    [address] [address] [phone]            2018 10:30 AM CST   Treatment with [facility]   [facility] ([facility] )    [address] [address] [phone]              Who to contact     If you have questions or need follow up information about today's clinic visit or your schedule please contact [facility] directly at [phone].  Normal or non-critical lab and imaging results will be communicated to you by [employer], letter or phone within 4 business days after the clinic has received the results. If you do not hear from us within 7 days, please contact the clinic through [employer] or phone. If you have a critical or abnormal lab result, we will notify you by phone as soon as possible.  Submit refill requests through [employer] or call your pharmacy and they will forward the refill request to us. Please allow 3 business days for your refill to be completed.          Additional Information About Your Visit        [employer] Information     [employer] lets you send messages to your doctor, view your test results, renew your prescriptions, schedule appointments and more. To sign up, go to [email or web address] . Click on \"Log in\" on the left side of the screen, which will take you to the Welcome page. Then click on \"Sign up Now\" on the right side of the page.     You will be asked to enter the access code listed below, as well as some personal information. Please follow the directions to create your username and password.     Your access code is: [email or web address]  Expires: 2018 11:17 AM     Your access code will  in 90 days. If you need help or a new code, please call your [place] clinic or [phone].        Care EveryWhere ID     This is your Care EveryWhere ID. This could be used by other organizations to access your [place] medical records  [id number]         Blood Pressure from Last 3 " Encounters:   01/24/18 128/68   01/17/18 100/68   01/10/18 102/64    Weight from Last 3 Encounters:   01/24/18 91.6 kg (202 lb)   01/17/18 89.8 kg (198 lb)   01/10/18 89.8 kg (198 lb)              Today, you had the following     No orders found for display       Primary Care Provider    None Specified       No primary provider on file.        Equal Access to Services     [facility]QU[facility]EL [facility] : Hadii chio ku ellieo Solazarus, waaxda luqadaha, qaybta kaalmada adejavierda, mayito rubinchancemariano sorensen . So [facility] [phone].    ATENCIÓN: Si jassonla raul, tiene a ramirez disposición servicios gratuitos de asistencia lingüística. Llame al [phone].    We comply with applicable federal civil rights laws and [place] laws. We do not discriminate on the basis of race, color, national origin, age, disability, sex, sexual orientation, or gender identity.            Thank you!     Thank you for choosing [facility]  for your care. Our goal is always to provide you with excellent care. Hearing back from our patients is one way we can continue to improve our services. Please take a few minutes to complete the written survey that you may receive in the mail after your visit with us. Thank you!             Your Updated Medication List - Protect others around you: Learn how to safely use, store and throw away your medicines at www.disposemymeds.org.          This list is accurate as of 1/31/18 10:50 AM.  Always use your most recent med list.                   Brand Name Dispense Instructions for use Diagnosis    aspirin 81 MG chewable tablet      81 mg        atorvastatin 20 MG tablet    LIPITOR     Take 10 mg by mouth        busPIRone 10 MG tablet    BUSPAR     1 1/2 tabs twice daily        enzalutamide 40 MG capsule    XTANDI     Take 80 mg by mouth        gabapentin 300 MG capsule    NEURONTIN     2 tablets morning, 2 afternoon, 3 at bedtime.        HYDROcodone-acetaminophen 5-325 MG per tablet    NORCO     ONE   TABLET  TWICE PRN PAIN Limit acetaminophen to 4000 mg per day from all sources.        insulin glargine 100 UNIT/ML injection    LANTUS     Inject 30 units daily under the skin        ipratropium 17 MCG/ACT Inhaler    ATROVENT HFA     Inhale 2 puffs into the lungs        isosorbide mononitrate 30 MG 24 hr tablet    IMDUR     Take 30 mg by mouth        leuprolide 45 MG kit    LUPRON DEPOT     SHOT EVERY 6 MONTHS        losartan 25 MG tablet    COZAAR     Take 25 mg by mouth        metFORMIN 500 MG tablet    GLUCOPHAGE     Take 500 mg by mouth        nitroGLYcerin 0.4 MG sublingual tablet    NITROSTAT     Place 0.4 mg under the tongue        tamsulosin 0.4 MG capsule    FLOMAX     Take 0.4 mg by mouth        warfarin 5 MG tablet    COUMADIN     2.5 mg daily   7.5 mg M-F  Monitored by irena BOLANOS

## 2018-02-01 ENCOUNTER — ALLIED HEALTH/NURSE VISIT (OUTPATIENT)
Dept: RADIATION ONCOLOGY | Facility: HOSPITAL | Age: 83
End: 2018-02-01
Attending: RADIOLOGY
Payer: MEDICARE

## 2018-02-01 DIAGNOSIS — C61 PROSTATE CANCER (H): Primary | ICD-10-CM

## 2018-02-01 PROCEDURE — 77385 ZZH IMRT TREATMENT DELIVERY, SIMPLE: CPT | Performed by: RADIOLOGY

## 2018-02-01 PROCEDURE — 77336 RADIATION PHYSICS CONSULT: CPT | Performed by: RADIOLOGY

## 2018-02-01 NOTE — PROGRESS NOTES
Chelsea Harden received radiation therapy treatment today 02/01/18.    Rochelle Ortiz  February 1, 2018  10:34 AM

## 2018-02-02 ENCOUNTER — ALLIED HEALTH/NURSE VISIT (OUTPATIENT)
Dept: RADIATION ONCOLOGY | Facility: HOSPITAL | Age: 83
End: 2018-02-02

## 2018-02-02 DIAGNOSIS — C61 PROSTATE CANCER (H): Primary | ICD-10-CM

## 2018-02-02 PROCEDURE — 77385 ZZH IMRT TREATMENT DELIVERY, SIMPLE: CPT | Performed by: RADIOLOGY

## 2018-02-02 NOTE — MR AVS SNAPSHOT
After Visit Summary   2/2/2018    Chelsea Harden    MRN: [id number]           Patient Information     Date Of Birth          2/22/1934        Visit Information        Provider Department      2/2/2018 10:30 AM [facility]        Today's Diagnoses     Prostate cancer (H)    -  1       Follow-ups after your visit        Your next 10 appointments already scheduled     Feb 05, 2018 10:30 AM CST   Treatment with [facility]   [facility] ([facility] )    [address] [address]   [phone]            Feb 06, 2018 10:30 AM CST   Treatment with [facility]   [facility] ([facility] )    [address] [address]   [phone]            Feb 07, 2018 10:30 AM CST   Treatment with [facility]   [facility] ([facility] )    [address] [address]   [phone]            Feb 07, 2018 11:00 AM CST   on treatment visit with Emerson Costello MD   [facility] ([facility] )    [address] [address]   [phone]            Feb 08, 2018 10:30 AM CST   Treatment with [facility]   [facility] ([facility] )    [address] [address]   [phone]            Feb 09, 2018 10:30 AM CST   Treatment with [facility]   [facility] ([facility] )    [address] [address]   [phone]            Feb 12, 2018 10:30 AM CST   Treatment with [facility]   [facility] ([facility] )    [address] [address]   [phone]            Feb 13, 2018 10:30 AM CST   Treatment with [facility]   [facility] ([facility] )    [address] [address]   [phone]            Feb 14, 2018 10:30 AM CST   Treatment with [facility]   [facility] ([facility] )    [address] [address] [phone]            Feb 15, 2018 10:30 AM CST   Treatment with [facility]   [facility] ([facility] )    [address] [address] [phone]              Who to contact     If you have questions or need follow up information about today's clinic visit or your schedule please contact [facility] directly at [phone].  Normal or non-critical lab and imaging results will be communicated to you by [employer], letter or phone within 4 business days after the clinic has received the results. If you do not hear from us within 7 days, please contact the clinic through [employer] or phone. If you have a critical or abnormal lab result, we will notify you by phone as soon as possible.  Submit refill requests through [employer] or call your pharmacy and they will forward the refill request to us. Please allow 3 business days for your refill to be completed.          Additional Information About Your Visit        [employer] Information     [employer] lets you send messages to your doctor, view your test results, renew your prescriptions, schedule appointments and more. To sign up, go to [email or web address] . Click on \"Log in\" on the left side of the screen, which will take you to the Welcome page. Then click on \"Sign up Now\" on the right side of the page.     You will be asked to enter the access code listed below, as well as some personal information. Please follow the directions to create your username and password.     Your access code is: [email or web address]  Expires: 2018 11:17 AM     Your access code will  in 90 days. If you need help or a new code, please call your [place] clinic or [phone].        Care EveryWhere ID     This is your Care EveryWhere ID. This could be used by other organizations to access your [place] medical records  [id number]         Blood Pressure from Last 3 Encounters: "   01/31/18 130/80   01/24/18 128/68   01/17/18 100/68    Weight from Last 3 Encounters:   01/31/18 93.2 kg (205 lb 8 oz)   01/24/18 91.6 kg (202 lb)   01/17/18 89.8 kg (198 lb)              Today, you had the following     No orders found for display       Primary Care Provider    None Specified       No primary provider on file.        Equal Access to Services     EZEQUIEL ZAMARRIPA : Hadii chio ku ellieo Solazaurs, waaxda luqadaha, qaybta kaalmada fletcherda, mayito rubinchancemariano sorensen . So [facility] [phone].    ATENCIÓN: Si jose carter, tiene a ramirez disposición servicios gratuitos de asistencia lingüística. Llame al [phone].    We comply with applicable federal civil rights laws and [place] laws. We do not discriminate on the basis of race, color, national origin, age, disability, sex, sexual orientation, or gender identity.            Thank you!     Thank you for choosing [facility]  for your care. Our goal is always to provide you with excellent care. Hearing back from our patients is one way we can continue to improve our services. Please take a few minutes to complete the written survey that you may receive in the mail after your visit with us. Thank you!             Your Updated Medication List - Protect others around you: Learn how to safely use, store and throw away your medicines at www.disposemymeds.org.          This list is accurate as of 2/2/18 10:38 AM.  Always use your most recent med list.                   Brand Name Dispense Instructions for use Diagnosis    aspirin 81 MG chewable tablet      81 mg        atorvastatin 20 MG tablet    LIPITOR     Take 10 mg by mouth        busPIRone 10 MG tablet    BUSPAR     1 1/2 tabs twice daily        enzalutamide 40 MG capsule    XTANDI     Take 80 mg by mouth        gabapentin 300 MG capsule    NEURONTIN     2 tablets morning, 2 afternoon, 3 at bedtime.        HYDROcodone-acetaminophen 5-325 MG per tablet    NORCO     ONE  TABLET   TWICE PRN PAIN Limit acetaminophen to 4000 mg per day from all sources.        insulin glargine 100 UNIT/ML injection    LANTUS     Inject 30 units daily under the skin        ipratropium 17 MCG/ACT Inhaler    ATROVENT HFA     Inhale 2 puffs into the lungs        isosorbide mononitrate 30 MG 24 hr tablet    IMDUR     Take 30 mg by mouth        leuprolide 45 MG kit    LUPRON DEPOT     SHOT EVERY 6 MONTHS        losartan 25 MG tablet    COZAAR     Take 25 mg by mouth        metFORMIN 500 MG tablet    GLUCOPHAGE     Take 500 mg by mouth        nitroGLYcerin 0.4 MG sublingual tablet    NITROSTAT     Place 0.4 mg under the tongue        tamsulosin 0.4 MG capsule    FLOMAX     Take 0.4 mg by mouth        Walker Misc      Front wheeled walker        warfarin 5 MG tablet    COUMADIN     2.5 mg daily   7.5 mg M-F  Monitored by irena BOLANOS

## 2018-02-02 NOTE — PROGRESS NOTES
Chelsea Harden received radiation therapy treatment today 02/02/18.    Mary Avila  February 2, 2018  10:32 AM

## 2018-02-05 ENCOUNTER — ALLIED HEALTH/NURSE VISIT (OUTPATIENT)
Dept: RADIATION ONCOLOGY | Facility: HOSPITAL | Age: 83
End: 2018-02-05

## 2018-02-05 DIAGNOSIS — C61 PROSTATE CANCER (H): Primary | ICD-10-CM

## 2018-02-05 PROCEDURE — 77385 ZZH IMRT TREATMENT DELIVERY, SIMPLE: CPT | Performed by: RADIOLOGY

## 2018-02-05 NOTE — PROGRESS NOTES
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         RADIATION RX:  John Mcmanus has received 5510 cGy to date for treatment of the above-described prostate carcinoma.         SUBJECTIVE:  He is still doing fairly well with regard to his treatment.  He was in a nursing home and has been discharged now.  He has had, I believe, Cardiology followup at [facility].  Again, in regard to his prostate carcinoma treatment he is doing quite well.  Not much change in GI or  symptomatology.         OBJECTIVE:      VITAL SIGNS:   Weight 205.5 pounds.      ABDOMEN:  Benign.  Bowel sounds present.         IMPRESSION:  Routine tolerance to radiation therapy for prostate carcinoma.         PLAN:  Continue treatment as planned.         DAJA BERNARDO MD             D: 2018   T: 2018   MT: RENNY      Name:     JOHN MCMANUS   MRN:      [id number]        Account:      [id number]   :      1934           Service Date: 2018      Document: [id number]

## 2018-02-05 NOTE — MR AVS SNAPSHOT
After Visit Summary   2/5/2018    Chelsea Harden    MRN: [id number]           Patient Information     Date Of Birth          2/22/1934        Visit Information        Provider Department      2/5/2018 10:30 AM [facility]        Today's Diagnoses     Prostate cancer (H)    -  1       Follow-ups after your visit        Your next 10 appointments already scheduled     Feb 06, 2018 10:30 AM CST   Treatment with [facility]   [facility] ([facility] )    [address] [address]   [phone]            Feb 07, 2018 10:30 AM CST   Treatment with [facility]   [facility] ([facility] )    [address] [address]   [phone]            Feb 07, 2018 11:00 AM CST   on treatment visit with Emerson Costello MD   [facility] ([facility] )    [address] [address]   [phone]            Feb 08, 2018 10:30 AM CST   Treatment with [facility]   [facility] ([facility] )    [address] [address]   [phone]            Feb 09, 2018 10:30 AM CST   Treatment with [facility]   [facility] ([facility] )    [address] [address]   [phone]            Feb 12, 2018 10:30 AM CST   Treatment with [facility]   [facility] ([facility] )    [address] [address]   [phone]            Feb 13, 2018 10:30 AM CST   Treatment with [facility]   [facility] ([facility] )    [address] [address]   [phone]            Feb 14, 2018 10:30 AM CST   Treatment with [facility]   [facility] ([facility] )    [address] [address]   [phone]            Feb 15, 2018 10:30 AM CST   Treatment with [facility]   [facility] Radiation [facility]cology ([facility] )    [address] [address] [phone]            2018 10:30 AM CST   Treatment with [facility]   [facility] ([facility] )    [address] [address] [phone]              Who to contact     If you have questions or need follow up information about today's clinic visit or your schedule please contact [facility] directly at [phone].  Normal or non-critical lab and imaging results will be communicated to you by [employer], letter or phone within 4 business days after the clinic has received the results. If you do not hear from us within 7 days, please contact the clinic through [employer] or phone. If you have a critical or abnormal lab result, we will notify you by phone as soon as possible.  Submit refill requests through [employer] or call your pharmacy and they will forward the refill request to us. Please allow 3 business days for your refill to be completed.          Additional Information About Your Visit        [employer] Information     [employer] lets you send messages to your doctor, view your test results, renew your prescriptions, schedule appointments and more. To sign up, go to [email or web address] . Click on \"Log in\" on the left side of the screen, which will take you to the Welcome page. Then click on \"Sign up Now\" on the right side of the page.     You will be asked to enter the access code listed below, as well as some personal information. Please follow the directions to create your username and password.     Your access code is: [email or web address]  Expires: 2018 11:17 AM     Your access code will  in 90 days. If you need help or a new code, please call your [place] clinic or [phone].        Care EveryWhere ID     This is your Care EveryWhere ID. This could be used by other organizations to access your [place] medical records  [id number]         Blood Pressure from Last 3 Encounters: "   01/31/18 130/80   01/24/18 128/68   01/17/18 100/68    Weight from Last 3 Encounters:   01/31/18 93.2 kg (205 lb 8 oz)   01/24/18 91.6 kg (202 lb)   01/17/18 89.8 kg (198 lb)              Today, you had the following     No orders found for display       Primary Care Provider    None Specified       No primary provider on file.        Equal Access to Services     EZEQUIEL ZAMARRIPA : Hadii chio ku ellieo Solazarus, waaxda luqadaha, qaybta kaalmada fletcherda, mayito rubinchancemariano sorensen . So [facility] [phone].    ATENCIÓN: Si jose carter, tiene a ramirez disposición servicios gratuitos de asistencia lingüística. Llame al [phone].    We comply with applicable federal civil rights laws and [place] laws. We do not discriminate on the basis of race, color, national origin, age, disability, sex, sexual orientation, or gender identity.            Thank you!     Thank you for choosing [facility]  for your care. Our goal is always to provide you with excellent care. Hearing back from our patients is one way we can continue to improve our services. Please take a few minutes to complete the written survey that you may receive in the mail after your visit with us. Thank you!             Your Updated Medication List - Protect others around you: Learn how to safely use, store and throw away your medicines at www.disposemymeds.org.          This list is accurate as of 2/5/18 10:42 AM.  Always use your most recent med list.                   Brand Name Dispense Instructions for use Diagnosis    aspirin 81 MG chewable tablet      81 mg        atorvastatin 20 MG tablet    LIPITOR     Take 10 mg by mouth        busPIRone 10 MG tablet    BUSPAR     1 1/2 tabs twice daily        enzalutamide 40 MG capsule    XTANDI     Take 80 mg by mouth        gabapentin 300 MG capsule    NEURONTIN     2 tablets morning, 2 afternoon, 3 at bedtime.        HYDROcodone-acetaminophen 5-325 MG per tablet    NORCO     ONE  TABLET   TWICE PRN PAIN Limit acetaminophen to 4000 mg per day from all sources.        insulin glargine 100 UNIT/ML injection    LANTUS     Inject 30 units daily under the skin        ipratropium 17 MCG/ACT Inhaler    ATROVENT HFA     Inhale 2 puffs into the lungs        isosorbide mononitrate 30 MG 24 hr tablet    IMDUR     Take 30 mg by mouth        leuprolide 45 MG kit    LUPRON DEPOT     SHOT EVERY 6 MONTHS        losartan 25 MG tablet    COZAAR     Take 25 mg by mouth        metFORMIN 500 MG tablet    GLUCOPHAGE     Take 500 mg by mouth        nitroGLYcerin 0.4 MG sublingual tablet    NITROSTAT     Place 0.4 mg under the tongue        tamsulosin 0.4 MG capsule    FLOMAX     Take 0.4 mg by mouth        Walker Misc      Front wheeled walker        warfarin 5 MG tablet    COUMADIN     2.5 mg daily   7.5 mg M-F  Monitored by irena BOLANOS

## 2018-02-05 NOTE — PROGRESS NOTES
Chelsea Harden received radiation therapy treatment today 02/05/18.    Marcelo Payne  February 5, 2018  10:34 AM

## 2018-02-06 ENCOUNTER — ALLIED HEALTH/NURSE VISIT (OUTPATIENT)
Dept: RADIATION ONCOLOGY | Facility: HOSPITAL | Age: 83
End: 2018-02-06

## 2018-02-06 DIAGNOSIS — C61 PROSTATE CANCER (H): Primary | ICD-10-CM

## 2018-02-06 PROCEDURE — 77385 ZZH IMRT TREATMENT DELIVERY, SIMPLE: CPT | Performed by: RADIOLOGY

## 2018-02-06 NOTE — MR AVS SNAPSHOT
After Visit Summary   2/6/2018    Chelsea Harden    MRN: [id number]           Patient Information     Date Of Birth          2/22/1934        Visit Information        Provider Department      2/6/2018 10:30 AM [facility]        Today's Diagnoses     Prostate cancer (H)    -  1       Follow-ups after your visit        Your next 10 appointments already scheduled     Feb 07, 2018 10:30 AM CST   Treatment with [facility]   [facility] ([facility] )    [address] [address]   [phone]            Feb 07, 2018 10:45 AM CST   on treatment visit with Emerson Costello MD   [facility] Rad[facility] ([facility] )    [address] [address]   [phone]            Feb 08, 2018 10:30 AM CST   Treatment with [facility]   [facility] ([facility] )    [address] [address]   [phone]            Feb 09, 2018 10:30 AM CST   Treatment with [facility]   [facility] ([facility] )    [address] [address]   [phone]            Feb 12, 2018 10:30 AM CST   Treatment with [facility]   [facility] ([facility] )    [address] [address]   [phone]            Feb 13, 2018 10:30 AM CST   Treatment with [facility]   [facility] ([facility] )    [address] [address]   [phone]            Feb 14, 2018 10:30 AM CST   Treatment with [facility]   [facility] ([facility] )    [address] [address]   [phone]            Feb 15, 2018 10:30 AM CST   Treatment with [facility]   [facility] ([facility] )    [address] [address]   [phone]            Feb 16, 2018 10:30 AM CST   Treatment with [facility]   [facility] Radiation Oncology ([facility]  "[address] [address] [phone]              Who to contact     If you have questions or need follow up information about today's clinic visit or your schedule please contact [facility] directly at [phone].  Normal or non-critical lab and imaging results will be communicated to you by MyChart, letter or phone within 4 business days after the clinic has received the results. If you do not hear from us within 7 days, please contact the clinic through MyChart or phone. If you have a critical or abnormal lab result, we will notify you by phone as soon as possible.  Submit refill requests through [employer] or call your pharmacy and they will forward the refill request to us. Please allow 3 business days for your refill to be completed.          Additional Information About Your Visit        [employer] Information     [employer] lets you send messages to your doctor, view your test results, renew your prescriptions, schedule appointments and more. To sign up, go to [email or web address] . Click on \"Log in\" on the left side of the screen, which will take you to the Welcome page. Then click on \"Sign up Now\" on the right side of the page.     You will be asked to enter the access code listed below, as well as some personal information. Please follow the directions to create your username and password.     Your access code is: [email or web address]  Expires: 2018 11:17 AM     Your access code will  in 90 days. If you need help or a new code, please call your [place] clinic or [phone].        Care EveryWhere ID     This is your Care EveryWhere ID. This could be used by other organizations to access your [place] medical records  [id number]         Blood Pressure from Last 3 Encounters:   18 130/80   18 128/68   18 100/68    Weight from Last 3 Encounters:   18 93.2 kg (205 lb 8 oz)   18 91.6 kg (202 lb)   18 89.8 kg (198 lb)         "      Today, you had the following     No orders found for display       Primary Care Provider    None Specified       No primary provider on file.        Equal Access to Services     CRISTINA ZAMARRIPA : Hadii aad ku hadkenji Lombardo, valentinada alexandroyaniqueha, tash sams osmelcarito, mayito harshain hayaatiffany bolivarmarti jordan edu leach. So [facility] [phone].    ATENCIÓN: Si habla español, tiene a ramirez disposición servicios gratuitos de asistencia lingüística. Llame al [phone].    We comply with applicable federal civil rights laws and [place] laws. We do not discriminate on the basis of race, color, national origin, age, disability, sex, sexual orientation, or gender identity.            Thank you!     Thank you for choosing [facility]  for your care. Our goal is always to provide you with excellent care. Hearing back from our patients is one way we can continue to improve our services. Please take a few minutes to complete the written survey that you may receive in the mail after your visit with us. Thank you!             Your Updated Medication List - Protect others around you: Learn how to safely use, store and throw away your medicines at www.disposemymeds.org.          This list is accurate as of 2/6/18 10:53 AM.  Always use your most recent med list.                   Brand Name Dispense Instructions for use Diagnosis    aspirin 81 MG chewable tablet      81 mg        atorvastatin 20 MG tablet    LIPITOR     Take 10 mg by mouth        busPIRone 10 MG tablet    BUSPAR     1 1/2 tabs twice daily        enzalutamide 40 MG capsule    XTANDI     Take 80 mg by mouth        gabapentin 300 MG capsule    NEURONTIN     2 tablets morning, 2 afternoon, 3 at bedtime.        HYDROcodone-acetaminophen 5-325 MG per tablet    NORCO     ONE  TABLET  TWICE PRN PAIN Limit acetaminophen to 4000 mg per day from all sources.        insulin glargine 100 UNIT/ML injection    LANTUS     Inject 30 units daily under the skin        ipratropium  17 MCG/ACT Inhaler    ATROVENT HFA     Inhale 2 puffs into the lungs        isosorbide mononitrate 30 MG 24 hr tablet    IMDUR     Take 30 mg by mouth        leuprolide 45 MG kit    LUPRON DEPOT     SHOT EVERY 6 MONTHS        losartan 25 MG tablet    COZAAR     Take 25 mg by mouth        metFORMIN 500 MG tablet    GLUCOPHAGE     Take 500 mg by mouth        nitroGLYcerin 0.4 MG sublingual tablet    NITROSTAT     Place 0.4 mg under the tongue        tamsulosin 0.4 MG capsule    FLOMAX     Take 0.4 mg by mouth        Walker Misc      Front wheeled walker        warfarin 5 MG tablet    COUMADIN     2.5 mg daily   7.5 mg M-F  Monitored by irena BOLANOS

## 2018-02-07 ENCOUNTER — ALLIED HEALTH/NURSE VISIT (OUTPATIENT)
Dept: RADIATION ONCOLOGY | Facility: HOSPITAL | Age: 83
End: 2018-02-07

## 2018-02-07 ENCOUNTER — OFFICE VISIT (OUTPATIENT)
Dept: RADIATION ONCOLOGY | Facility: HOSPITAL | Age: 83
End: 2018-02-07
Attending: RADIOLOGY
Payer: MEDICARE

## 2018-02-07 VITALS
RESPIRATION RATE: 16 BRPM | SYSTOLIC BLOOD PRESSURE: 92 MMHG | DIASTOLIC BLOOD PRESSURE: 50 MMHG | BODY MASS INDEX: 31.32 KG/M2 | HEART RATE: 60 BPM | WEIGHT: 206 LBS

## 2018-02-07 DIAGNOSIS — C61 PROSTATE CANCER (H): Primary | ICD-10-CM

## 2018-02-07 PROCEDURE — 77385 ZZH IMRT TREATMENT DELIVERY, SIMPLE: CPT | Performed by: RADIOLOGY

## 2018-02-07 ASSESSMENT — PAIN SCALES - GENERAL: PAINLEVEL: MODERATE PAIN (4)

## 2018-02-07 NOTE — MR AVS SNAPSHOT
After Visit Summary   2/7/2018    Chelsea Harden    MRN: [id number]           Patient Information     Date Of Birth          2/22/1934        Visit Information        Provider Department      2/7/2018 10:45 AM Emerson Costello MD [facility]        Today's Diagnoses     Prostate cancer (H)    -  1       Follow-ups after your visit        Your next 10 appointments already scheduled     Feb 08, 2018 10:30 AM CST   Treatment with [facility]   [facility] ([facility] )    [address] [id number] [phone]            Feb 09, 2018 10:30 AM CST   Treatment with [facility]   [facility] ([facility] )    [address] [address] [phone]            Feb 12, 2018 10:30 AM CST   Treatment with [facility]   [facility] ([facility] )    [address] [id number] [phone]            Feb 13, 2018 10:30 AM CST   Treatment with [facility]   [facility] ([facility] )    [address] [address]   [phone]            Feb 14, 2018 10:30 AM CST   Treatment with [facility]   [facility] ([facility] )    [address] [address] [phone]            Feb 15, 2018 10:30 AM CST   Treatment with [facility]   [facility] ([facility] )    [address] [address] [phone]            Feb 16, 2018 10:30 AM CST   Treatment with [facility]   [facility] ([facility] )    [address] [address] [phone]              Who to contact     If you have questions or need follow up information about today's clinic visit or your schedule please contact [facility] directly at [phone].  Normal or non-critical lab and imaging results will be communicated to you by MyChart, letter or  "phone within 4 business days after the clinic has received the results. If you do not hear from us within 7 days, please contact the clinic through [employer] or phone. If you have a critical or abnormal lab result, we will notify you by phone as soon as possible.  Submit refill requests through [employer] or call your pharmacy and they will forward the refill request to us. Please allow 3 business days for your refill to be completed.          Additional Information About Your Visit        [employer] Information     [employer] lets you send messages to your doctor, view your test results, renew your prescriptions, schedule appointments and more. To sign up, go to [email or web address] . Click on \"Log in\" on the left side of the screen, which will take you to the Welcome page. Then click on \"Sign up Now\" on the right side of the page.     You will be asked to enter the access code listed below, as well as some personal information. Please follow the directions to create your username and password.     Your access code is: [email or web address]  Expires: 2018 11:17 AM     Your access code will  in 90 days. If you need help or a new code, please call your [place] clinic or [phone].        Care EveryWhere ID     This is your Care EveryWhere ID. This could be used by other organizations to access your [place] medical records  [id number]        Your Vitals Were     Pulse Respirations BMI (Body Mass Index)             60 16 31.32 kg/m2          Blood Pressure from Last 3 Encounters:   18 92/50   18 130/80   18 128/68    Weight from Last 3 Encounters:   18 93.4 kg (206 lb)   18 93.2 kg (205 lb 8 oz)   18 91.6 kg (202 lb)              Today, you had the following     No orders found for display       Primary Care Provider    None Specified       No primary provider on file.        Equal Access to Services     CRISTINA ZAMARRIPA AH: David Lombardo, vivian arzate, tash sams " mayito cheneymarti farrisaatiffany ah. Mayuri [facility] [phone].    ATENCIÓN: Si habla raul, tiene a ramirez disposición servicios gratuitos de asistencia lingüística. Luis Miguel al [phone].    We comply with applicable federal civil rights laws and [place] laws. We do not discriminate on the basis of race, color, national origin, age, disability, sex, sexual orientation, or gender identity.            Thank you!     Thank you for choosing [facility]  for your care. Our goal is always to provide you with excellent care. Hearing back from our patients is one way we can continue to improve our services. Please take a few minutes to complete the written survey that you may receive in the mail after your visit with us. Thank you!             Your Updated Medication List - Protect others around you: Learn how to safely use, store and throw away your medicines at www.disposemymeds.org.          This list is accurate as of 2/7/18  3:30 PM.  Always use your most recent med list.                   Brand Name Dispense Instructions for use Diagnosis    aspirin 81 MG chewable tablet      81 mg        atorvastatin 20 MG tablet    LIPITOR     Take 10 mg by mouth        busPIRone 10 MG tablet    BUSPAR     1 1/2 tabs twice daily        enzalutamide 40 MG capsule    XTANDI     Take 80 mg by mouth        gabapentin 300 MG capsule    NEURONTIN     2 tablets morning, 2 afternoon, 3 at bedtime.        HYDROcodone-acetaminophen 5-325 MG per tablet    NORCO     ONE  TABLET  TWICE PRN PAIN Limit acetaminophen to 4000 mg per day from all sources.        insulin glargine 100 UNIT/ML injection    LANTUS     Inject 30 units daily under the skin        ipratropium 17 MCG/ACT Inhaler    ATROVENT HFA     Inhale 2 puffs into the lungs        isosorbide mononitrate 30 MG 24 hr tablet    IMDUR     Take 30 mg by mouth        leuprolide 45 MG kit    LUPRON DEPOT     SHOT EVERY 6 MONTHS        losartan 25 MG tablet    COZAAR      Take 25 mg by mouth        metFORMIN 500 MG tablet    GLUCOPHAGE     Take 500 mg by mouth        nitroGLYcerin 0.4 MG sublingual tablet    NITROSTAT     Place 0.4 mg under the tongue        Simethicone 125 MG Tabs      Take 125 mg by mouth 3 times daily        tamsulosin 0.4 MG capsule    FLOMAX     Take 0.4 mg by mouth        Walker Misc      Front wheeled walker        warfarin 5 MG tablet    COUMADIN     2.5 mg daily   7.5 mg M-F  Monitored by irena BOLANOS

## 2018-02-07 NOTE — PROGRESS NOTES
Chelsea Harden received radiation therapy treatment today 02/07/18.    Marcelo Payne  February 7, 2018  10:26 AM

## 2018-02-07 NOTE — PROGRESS NOTES
Service Date: 2018      REFERRING PHYSICIANS:  David Saldaña MD; Marcial Vera DO; Singh Hare MD.      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      RADIATION THERAPY:  John Mcmanus has received 6460 cGy to date for treatment of the above described prostate carcinoma.      SUBJECTIVE:  Still doing well with regard to his radiation treatment, more or less clinically stable.  No kim diarrhea.      OBJECTIVE:  Weight 206.4 pounds.  Abdomen is benign.  Bowel sounds present.      IMPRESSION:  Routine tolerance to radiation therapy for prostate carcinoma thought to be refractory to Lupron, Heather 3+4.      PLAN:  Continue treatment as planned.         DAJA BERNARDO MD             D: 2018   T: 2018   MT: EMMETT      Name:     JOHN MCMANUS   MRN:      [id number]        Account:      [id number]   :      1934           Service Date: 2018      Document: [id number]

## 2018-02-08 ENCOUNTER — ALLIED HEALTH/NURSE VISIT (OUTPATIENT)
Dept: RADIATION ONCOLOGY | Facility: HOSPITAL | Age: 83
End: 2018-02-08

## 2018-02-08 DIAGNOSIS — C61 PROSTATE CANCER (H): Primary | ICD-10-CM

## 2018-02-08 PROCEDURE — 77385 ZZH IMRT TREATMENT DELIVERY, SIMPLE: CPT | Performed by: RADIOLOGY

## 2018-02-08 PROCEDURE — 77336 RADIATION PHYSICS CONSULT: CPT | Performed by: RADIOLOGY

## 2018-02-08 NOTE — MR AVS SNAPSHOT
After Visit Summary   2/8/2018    Chelsea Harden    MRN: [id number]           Patient Information     Date Of Birth          2/22/1934        Visit Information        Provider Department      2/8/2018 10:30 AM [facility]        Today's Diagnoses     Prostate cancer (H)    -  1       Follow-ups after your visit        Your next 10 appointments already scheduled     Feb 09, 2018 10:30 AM CST   Treatment with [facility]   [facility] ([facility] )    [address] [id number] [phone]            Feb 12, 2018 10:30 AM CST   Treatment with [facility]   [facility] ([facility] )    [address] [id number] [phone]            Feb 13, 2018 10:30 AM CST   Treatment with [facility]   [facility] ([facility] )    [address] [address]   [phone]            Feb 14, 2018 10:30 AM CST   Treatment with [facility]   [facility] ([facility] )    [address] [address]   [phone]            Feb 15, 2018 10:30 AM CST   Treatment with [facility]   [facility] ([facility] )    [address] [id number] [phone]            Feb 16, 2018 10:30 AM CST   Treatment with [facility]   [facility] ([facility] )    [address] [id number] [phone]              Who to contact     If you have questions or need follow up information about today's clinic visit or your schedule please contact [facility] directly at [phone].  Normal or non-critical lab and imaging results will be communicated to you by MyChart, letter or phone within 4 business days after the clinic has received the results. If you do not hear from us within 7 days, please contact the clinic through MyChart or phone. If you have a critical  "or abnormal lab result, we will notify you by phone as soon as possible.  Submit refill requests through [employer] or call your pharmacy and they will forward the refill request to us. Please allow 3 business days for your refill to be completed.          Additional Information About Your Visit        [employer] Information     [employer] lets you send messages to your doctor, view your test results, renew your prescriptions, schedule appointments and more. To sign up, go to [email or web address] . Click on \"Log in\" on the left side of the screen, which will take you to the Welcome page. Then click on \"Sign up Now\" on the right side of the page.     You will be asked to enter the access code listed below, as well as some personal information. Please follow the directions to create your username and password.     Your access code is: [email or web address]  Expires: 2018 11:17 AM     Your access code will  in 90 days. If you need help or a new code, please call your [place] clinic or [phone].        Care EveryWhere ID     This is your Care EveryWhere ID. This could be used by other organizations to access your [place] medical records  [id number]         Blood Pressure from Last 3 Encounters:   18 92/50   18 130/80   18 128/68    Weight from Last 3 Encounters:   18 93.4 kg (206 lb)   18 93.2 kg (205 lb 8 oz)   18 91.6 kg (202 lb)              Today, you had the following     No orders found for display       Primary Care Provider    None Specified       No primary provider on file.        Equal Access to Services     [facility]: Hadii chio Lombardo, waaxda luqadaha, qaybta kaalmayito newby . So [facility] [phone].    ATENCIÓN: Si habla español, tiene a ramirez disposición servicios gratuitos de asistencia lingüística. Llame al [phone].    We comply with applicable federal civil rights laws and [place] laws. We do not " discriminate on the basis of race, color, national origin, age, disability, sex, sexual orientation, or gender identity.            Thank you!     Thank you for choosing [facility]  for your care. Our goal is always to provide you with excellent care. Hearing back from our patients is one way we can continue to improve our services. Please take a few minutes to complete the written survey that you may receive in the mail after your visit with us. Thank you!             Your Updated Medication List - Protect others around you: Learn how to safely use, store and throw away your medicines at www.disposemymeds.org.          This list is accurate as of 2/8/18 10:49 AM.  Always use your most recent med list.                   Brand Name Dispense Instructions for use Diagnosis    aspirin 81 MG chewable tablet      81 mg        atorvastatin 20 MG tablet    LIPITOR     Take 10 mg by mouth        busPIRone 10 MG tablet    BUSPAR     1 1/2 tabs twice daily        enzalutamide 40 MG capsule    XTANDI     Take 80 mg by mouth        gabapentin 300 MG capsule    NEURONTIN     2 tablets morning, 2 afternoon, 3 at bedtime.        HYDROcodone-acetaminophen 5-325 MG per tablet    NORCO     ONE  TABLET  TWICE PRN PAIN Limit acetaminophen to 4000 mg per day from all sources.        insulin glargine 100 UNIT/ML injection    LANTUS     Inject 30 units daily under the skin        ipratropium 17 MCG/ACT Inhaler    ATROVENT HFA     Inhale 2 puffs into the lungs        isosorbide mononitrate 30 MG 24 hr tablet    IMDUR     Take 30 mg by mouth        leuprolide 45 MG kit    LUPRON DEPOT     SHOT EVERY 6 MONTHS        losartan 25 MG tablet    COZAAR     Take 25 mg by mouth        metFORMIN 500 MG tablet    GLUCOPHAGE     Take 500 mg by mouth        nitroGLYcerin 0.4 MG sublingual tablet    NITROSTAT     Place 0.4 mg under the tongue        Simethicone 125 MG Tabs      Take 125 mg by mouth 3 times daily        tamsulosin 0.4 MG  capsule    FLOMAX     Take 0.4 mg by mouth        Walker Misc      Front wheeled walker        warfarin 5 MG tablet    COUMADIN     2.5 mg daily   7.5 mg M-F  Monitored by irena BOLANOS

## 2018-02-08 NOTE — PROGRESS NOTES
Chelsea Harden received radiation therapy treatment today 02/08/18.    Rochelle Ortiz  February 8, 2018  10:36 AM

## 2018-02-09 ENCOUNTER — ALLIED HEALTH/NURSE VISIT (OUTPATIENT)
Dept: RADIATION ONCOLOGY | Facility: HOSPITAL | Age: 83
End: 2018-02-09

## 2018-02-09 DIAGNOSIS — C61 PROSTATE CANCER (H): Primary | ICD-10-CM

## 2018-02-09 PROCEDURE — 77385 ZZH IMRT TREATMENT DELIVERY, SIMPLE: CPT | Performed by: RADIOLOGY

## 2018-02-09 NOTE — MR AVS SNAPSHOT
After Visit Summary   2/9/2018    Chelsea Harden    MRN: [id number]           Patient Information     Date Of Birth          2/22/1934        Visit Information        Provider Department      2/9/2018 10:30 AM [facility]        Today's Diagnoses     Prostate cancer (H)    -  1       Follow-ups after your visit        Your next 10 appointments already scheduled     Feb 12, 2018 10:30 AM CST   Treatment with [facility]   [facility] ([facility] )    [address] [id number] [phone]            Feb 13, 2018 10:30 AM CST   Treatment with [facility]   [facility] ([facility] )    [address] [id number] [phone]            Feb 14, 2018 10:30 AM CST   Treatment with [facility]   [facility] ([facility] )    [address] [id number] [phone]            Feb 15, 2018 10:30 AM CST   Treatment with [facility]   [facility] ([facility] )    [address] [id number] [phone]            Feb 16, 2018 10:30 AM CST   Treatment with [facility]   [facility] ([facility] )    [address] [id number] [phone]              Who to contact     If you have questions or need follow up information about today's clinic visit or your schedule please contact [facility] directly at [phone].  Normal or non-critical lab and imaging results will be communicated to you by MyChart, letter or phone within 4 business days after the clinic has received the results. If you do not hear from us within 7 days, please contact the clinic through [employer] or phone. If you have a critical or abnormal lab result, we will notify you by phone as soon as possible.  Submit refill requests through [employer] or call your pharmacy and they will forward the refill request to  "us. Please allow 3 business days for your refill to be completed.          Additional Information About Your Visit        MyChart Information     [employer] lets you send messages to your doctor, view your test results, renew your prescriptions, schedule appointments and more. To sign up, go to [email or web address] . Click on \"Log in\" on the left side of the screen, which will take you to the Welcome page. Then click on \"Sign up Now\" on the right side of the page.     You will be asked to enter the access code listed below, as well as some personal information. Please follow the directions to create your username and password.     Your access code is: [email or web address]  Expires: 2018 11:17 AM     Your access code will  in 90 days. If you need help or a new code, please call your [place] clinic or [phone].        Care EveryWhere ID     This is your Care EveryWhere ID. This could be used by other organizations to access your [place] medical records  [id number]         Blood Pressure from Last 3 Encounters:   18 92/50   18 130/80   18 128/68    Weight from Last 3 Encounters:   18 93.4 kg (206 lb)   18 93.2 kg (205 lb 8 oz)   18 91.6 kg (202 lb)              Today, you had the following     No orders found for display       Primary Care Provider    None Specified       No primary provider on file.        Equal Access to Services     [facility] Hadii chio Lombardo, wasarahda carito, qaybta kaalmada shravan, mayito sorensen . So [facility] [phone].    ATENCIÓN: Si habla español, tiene a ramirez disposición servicios gratuitos de asistencia lingüística. Llame al [phone].    We comply with applicable federal civil rights laws and [place] laws. We do not discriminate on the basis of race, color, national origin, age, disability, sex, sexual orientation, or gender identity.            Thank you!     Thank you for choosing [facility]  for your care. Our goal is always to provide you with excellent care. Hearing back from our patients is one way we can continue to improve our services. Please take a few minutes to complete the written survey that you may receive in the mail after your visit with us. Thank you!             Your Updated Medication List - Protect others around you: Learn how to safely use, store and throw away your medicines at www.disposemymeds.org.          This list is accurate as of 2/9/18 10:45 AM.  Always use your most recent med list.                   Brand Name Dispense Instructions for use Diagnosis    aspirin 81 MG chewable tablet      81 mg        atorvastatin 20 MG tablet    LIPITOR     Take 10 mg by mouth        busPIRone 10 MG tablet    BUSPAR     1 1/2 tabs twice daily        enzalutamide 40 MG capsule    XTANDI     Take 80 mg by mouth        gabapentin 300 MG capsule    NEURONTIN     2 tablets morning, 2 afternoon, 3 at bedtime.        HYDROcodone-acetaminophen 5-325 MG per tablet    NORCO     ONE  TABLET  TWICE PRN PAIN Limit acetaminophen to 4000 mg per day from all sources.        insulin glargine 100 UNIT/ML injection    LANTUS     Inject 30 units daily under the skin        ipratropium 17 MCG/ACT Inhaler    ATROVENT HFA     Inhale 2 puffs into the lungs        isosorbide mononitrate 30 MG 24 hr tablet    IMDUR     Take 30 mg by mouth        leuprolide 45 MG kit    LUPRON DEPOT     SHOT EVERY 6 MONTHS        losartan 25 MG tablet    COZAAR     Take 25 mg by mouth        metFORMIN 500 MG tablet    GLUCOPHAGE     Take 500 mg by mouth        nitroGLYcerin 0.4 MG sublingual tablet    NITROSTAT     Place 0.4 mg under the tongue        Simethicone 125 MG Tabs      Take 125 mg by mouth 3 times daily        tamsulosin 0.4 MG capsule    FLOMAX     Take 0.4 mg by mouth        Walker Misc      Front wheeled walker        warfarin 5 MG tablet    COUMADIN     2.5 mg daily   7.5 mg M-F  Monitored by irena BOLANOS

## 2018-02-09 NOTE — PROGRESS NOTES
Chelsea Harden received radiation therapy treatment today 02/09/18.    Marcelo Payne  February 9, 2018  10:40 AM

## 2018-02-12 ENCOUNTER — ALLIED HEALTH/NURSE VISIT (OUTPATIENT)
Dept: RADIATION ONCOLOGY | Facility: HOSPITAL | Age: 83
End: 2018-02-12

## 2018-02-12 DIAGNOSIS — C61 PROSTATE CANCER (H): Primary | ICD-10-CM

## 2018-02-12 PROCEDURE — 77385 ZZH IMRT TREATMENT DELIVERY, SIMPLE: CPT | Performed by: RADIOLOGY

## 2018-02-12 NOTE — MR AVS SNAPSHOT
After Visit Summary   2/12/2018    Chelsea Harden    MRN: [id number]           Patient Information     Date Of Birth          2/22/1934        Visit Information        Provider Department      2/12/2018 10:30 AM [facility]        Today's Diagnoses     Prostate cancer (H)    -  1       Follow-ups after your visit        Your next 10 appointments already scheduled     Feb 13, 2018 10:30 AM CST   Treatment with [facility]   [facility] ([facility] )    [address] [address] [phone]            Feb 14, 2018 10:30 AM CST   Treatment with [facility]   [facility] ([facility] )    [address] [address] [phone]            Feb 15, 2018 10:30 AM CST   Treatment with [facility]   [facility] ([facility] )    [address] [address] [phone]            Feb 16, 2018 10:30 AM CST   Treatment with [facility]   [facility] ([facility] )    [address] [address] [phone]              Who to contact     If you have questions or need follow up information about today's clinic visit or your schedule please contact [facility] directly at [phone].  Normal or non-critical lab and imaging results will be communicated to you by [employer], letter or phone within 4 business days after the clinic has received the results. If you do not hear from us within 7 days, please contact the clinic through [employer] or phone. If you have a critical or abnormal lab result, we will notify you by phone as soon as possible.  Submit refill requests through [employer] or call your pharmacy and they will forward the refill request to us. Please allow 3 business days for your refill to be completed.          Additional Information About Your Visit        [employer] Information     [employer] lets you send messages  "to your doctor, view your test results, renew your prescriptions, schedule appointments and more. To sign up, go to [email or web address] . Click on \"Log in\" on the left side of the screen, which will take you to the Welcome page. Then click on \"Sign up Now\" on the right side of the page.     You will be asked to enter the access code listed below, as well as some personal information. Please follow the directions to create your username and password.     Your access code is: [email or web address]  Expires: 2018 11:17 AM     Your access code will  in 90 days. If you need help or a new code, please call your [place] clinic or [phone].        Care EveryWhere ID     This is your Care EveryWhere ID. This could be used by other organizations to access your [place] medical records  [id number]         Blood Pressure from Last 3 Encounters:   18 92/50   18 130/80   18 128/68    Weight from Last 3 Encounters:   18 93.4 kg (206 lb)   18 93.2 kg (205 lb 8 oz)   18 91.6 kg (202 lb)              Today, you had the following     No orders found for display       Primary Care Provider    None Specified       No primary provider on file.        Equal Access to Services     [facility]: Hadii chio arguetao Grupo, waaxda luqadaha, qaybta kaalmada adecarito, mayito sorensen . So [facility] [phone].    ATENCIÓN: Si habla español, tiene a ramirez disposición servicios gratuitos de asistencia lingüística. Llame al [phone].    We comply with applicable federal civil rights laws and [place] laws. We do not discriminate on the basis of race, color, national origin, age, disability, sex, sexual orientation, or gender identity.            Thank you!     Thank you for choosing [facility]  for your care. Our goal is always to provide you with excellent care. Hearing back from our patients is one way we can continue to improve our services. Please take a " few minutes to complete the written survey that you may receive in the mail after your visit with us. Thank you!             Your Updated Medication List - Protect others around you: Learn how to safely use, store and throw away your medicines at www.disposemymeds.org.          This list is accurate as of 2/12/18 10:46 AM.  Always use your most recent med list.                   Brand Name Dispense Instructions for use Diagnosis    atorvastatin 20 MG tablet    LIPITOR     Take 10 mg by mouth        busPIRone 10 MG tablet    BUSPAR     1 1/2 tabs twice daily        enzalutamide 40 MG capsule    XTANDI     Take 80 mg by mouth        gabapentin 300 MG capsule    NEURONTIN     2 tablets morning, 2 afternoon, 3 at bedtime.        HYDROcodone-acetaminophen 5-325 MG per tablet    NORCO     ONE  TABLET  TWICE PRN PAIN Limit acetaminophen to 4000 mg per day from all sources.        insulin glargine 100 UNIT/ML injection    LANTUS     Inject 30 units daily under the skin        ipratropium 17 MCG/ACT Inhaler    ATROVENT HFA     Inhale 2 puffs into the lungs        isosorbide mononitrate 30 MG 24 hr tablet    IMDUR     Take 30 mg by mouth        leuprolide 45 MG kit    LUPRON DEPOT     SHOT EVERY 6 MONTHS        losartan 25 MG tablet    COZAAR     Take 25 mg by mouth        metFORMIN 500 MG tablet    GLUCOPHAGE     Take 500 mg by mouth        nitroGLYcerin 0.4 MG sublingual tablet    NITROSTAT     Place 0.4 mg under the tongue        Simethicone 125 MG Tabs      Take 125 mg by mouth 3 times daily        tamsulosin 0.4 MG capsule    FLOMAX     Take 0.4 mg by mouth        Walker Misc      Front wheeled walker        warfarin 5 MG tablet    COUMADIN     2.5 mg daily   7.5 mg M-F  Monitored by irena BOLANOS

## 2018-02-12 NOTE — PROGRESS NOTES
Chelsea Harden received radiation therapy treatment today 02/12/18.    Josias Pizano  February 12, 2018  10:43 AM

## 2018-02-14 ENCOUNTER — OFFICE VISIT (OUTPATIENT)
Dept: RADIATION ONCOLOGY | Facility: HOSPITAL | Age: 83
End: 2018-02-14

## 2018-02-14 ENCOUNTER — ALLIED HEALTH/NURSE VISIT (OUTPATIENT)
Dept: RADIATION ONCOLOGY | Facility: HOSPITAL | Age: 83
End: 2018-02-14

## 2018-02-14 VITALS
HEART RATE: 72 BPM | WEIGHT: 202 LBS | BODY MASS INDEX: 30.71 KG/M2 | SYSTOLIC BLOOD PRESSURE: 110 MMHG | RESPIRATION RATE: 16 BRPM | DIASTOLIC BLOOD PRESSURE: 64 MMHG

## 2018-02-14 DIAGNOSIS — C61 PROSTATE CANCER (H): Primary | ICD-10-CM

## 2018-02-14 PROCEDURE — 77385 ZZH IMRT TREATMENT DELIVERY, SIMPLE: CPT | Performed by: RADIOLOGY

## 2018-02-14 ASSESSMENT — PAIN SCALES - GENERAL: PAINLEVEL: MILD PAIN (3)

## 2018-02-14 NOTE — PROGRESS NOTES
Chelsea Harden received radiation therapy treatment today 02/14/18.    Josias Pizano  February 14, 2018  10:35 AM

## 2018-02-14 NOTE — PROGRESS NOTES
"Patient was assessed using the NCCN psychosocial distress thermometer. Patient rated the score as a 3-4. Patient rated current stressors as \"feeling tired\". Stressors will be brought to the attention of provider or Oncology RN Care Coordinator for a score of 6 or greater or per nurses discretion.             "

## 2018-02-14 NOTE — MR AVS SNAPSHOT
"              After Visit Summary   2/14/2018    Chelsea Harden    MRN: [id number]           Patient Information     Date Of Birth          2/22/1934        Visit Information        Provider Department      2/14/2018 10:45 AM Emerson Costello MD [facility]        Today's Diagnoses     Prostate cancer (H)    -  1       Follow-ups after your visit        Your next 10 appointments already scheduled     Feb 15, 2018 10:30 AM CST   Treatment with [facility]   [facility] ([facility] )    [address] [address] [phone]            Feb 16, 2018 10:30 AM CST   Treatment with [facility]   [facility] ([facility] )    [address] [address] [phone]            Feb 19, 2018 10:30 AM CST   Treatment with [facility]   [facility] ([facility] )    [address] [address] [phone]              Who to contact     If you have questions or need follow up information about today's clinic visit or your schedule please contact [facility] directly at [phone].  Normal or non-critical lab and imaging results will be communicated to you by [employer], letter or phone within 4 business days after the clinic has received the results. If you do not hear from us within 7 days, please contact the clinic through GreenCloudt or phone. If you have a critical or abnormal lab result, we will notify you by phone as soon as possible.  Submit refill requests through Putney or call your pharmacy and they will forward the refill request to us. Please allow 3 business days for your refill to be completed.          Additional Information About Your Visit        [employer] Information     Putney lets you send messages to your doctor, view your test results, renew your prescriptions, schedule appointments and more. To sign up, go to [email or web address] . Click on \"Log in\" on the left " "side of the screen, which will take you to the Welcome page. Then click on \"Sign up Now\" on the right side of the page.     You will be asked to enter the access code listed below, as well as some personal information. Please follow the directions to create your username and password.     Your access code is: [email or web address]  Expires: 2018 11:17 AM     Your access code will  in 90 days. If you need help or a new code, please call your [place] clinic or [phone].        Care EveryWhere ID     This is your Care EveryWhere ID. This could be used by other organizations to access your [place] medical records  [id number]        Your Vitals Were     Pulse Respirations BMI (Body Mass Index)             72 16 30.71 kg/m2          Blood Pressure from Last 3 Encounters:   18 110/64   18 92/50   18 130/80    Weight from Last 3 Encounters:   18 91.6 kg (202 lb)   18 93.4 kg (206 lb)   18 93.2 kg (205 lb 8 oz)              Today, you had the following     No orders found for display       Primary Care Provider    None Specified       No primary provider on file.        Equal Access to Services     EZEQUIEL ZAMARRIPA : Hadii chio Lombardo, wasarahda carito, qaybta kaalmada adejavierda, mayito sorensen . So [facility] [phone].    ATENCIÓN: Si habla español, tiene a ramirez disposición servicios gratuitos de asistencia lingüística. Llame al [phone].    We comply with applicable federal civil rights laws and [place] laws. We do not discriminate on the basis of race, color, national origin, age, disability, sex, sexual orientation, or gender identity.            Thank you!     Thank you for choosing [facility]  for your care. Our goal is always to provide you with excellent care. Hearing back from our patients is one way we can continue to improve our services. Please take a few minutes to complete the written survey that you may receive in the " mail after your visit with us. Thank you!             Your Updated Medication List - Protect others around you: Learn how to safely use, store and throw away your medicines at www.disposemymeds.org.          This list is accurate as of 2/14/18  3:05 PM.  Always use your most recent med list.                   Brand Name Dispense Instructions for use Diagnosis    atorvastatin 20 MG tablet    LIPITOR     Take 10 mg by mouth        busPIRone 10 MG tablet    BUSPAR     1 1/2 tabs twice daily        enzalutamide 40 MG capsule    XTANDI     Take 80 mg by mouth        gabapentin 300 MG capsule    NEURONTIN     2 tablets morning, 2 afternoon, 3 at bedtime.        HYDROcodone-acetaminophen 5-325 MG per tablet    NORCO     ONE  TABLET  TWICE PRN PAIN Limit acetaminophen to 4000 mg per day from all sources.        insulin glargine 100 UNIT/ML injection    LANTUS     Inject 30 units daily under the skin        ipratropium 17 MCG/ACT Inhaler    ATROVENT HFA     Inhale 2 puffs into the lungs        isosorbide mononitrate 30 MG 24 hr tablet    IMDUR     Take 30 mg by mouth        leuprolide 45 MG kit    LUPRON DEPOT     SHOT EVERY 6 MONTHS        losartan 25 MG tablet    COZAAR     Take 25 mg by mouth        metFORMIN 500 MG tablet    GLUCOPHAGE     Take 500 mg by mouth        nitroGLYcerin 0.4 MG sublingual tablet    NITROSTAT     Place 0.4 mg under the tongue        Simethicone 125 MG Tabs      Take 125 mg by mouth 3 times daily        tamsulosin 0.4 MG capsule    FLOMAX     Take 0.4 mg by mouth        Walker Misc      Front wheeled walker        warfarin 5 MG tablet    COUMADIN     2.5 mg daily   7.5 mg M-F  Monitored by irena BOLANOS

## 2018-02-14 NOTE — MR AVS SNAPSHOT
After Visit Summary   2/14/2018    Chelsea Harden    MRN: [id number]           Patient Information     Date Of Birth          2/22/1934        Visit Information        Provider Department      2/14/2018 10:30 AM [facility]        Today's Diagnoses     Prostate cancer (H)    -  1       Follow-ups after your visit        Your next 10 appointments already scheduled     Feb 14, 2018 10:45 AM CST   on treatment visit with Emerson Costello MD   [facility] ([facility] )    [address] [address] [phone]            Feb 15, 2018 10:30 AM CST   Treatment with [facility]   [facility] ([facility] )    [address] [address] [phone]            Feb 16, 2018 10:30 AM CST   Treatment with [facility]   [facility] ([facility] )    [address] [address] [phone]            Feb 19, 2018 10:30 AM CST   Treatment with [facility]   [facility] ([facility] )    [address] [address] [phone]              Who to contact     If you have questions or need follow up information about today's clinic visit or your schedule please contact [facility] directly at [phone].  Normal or non-critical lab and imaging results will be communicated to you by StrikeAdhart, letter or phone within 4 business days after the clinic has received the results. If you do not hear from us within 7 days, please contact the clinic through StrikeAdhart or phone. If you have a critical or abnormal lab result, we will notify you by phone as soon as possible.  Submit refill requests through Information Assurance or call your pharmacy and they will forward the refill request to us. Please allow 3 business days for your refill to be completed.          Additional Information About Your Visit        MyCharNavut Information     Information Assurance lets you  "send messages to your doctor, view your test results, renew your prescriptions, schedule appointments and more. To sign up, go to [email or web address] . Click on \"Log in\" on the left side of the screen, which will take you to the Welcome page. Then click on \"Sign up Now\" on the right side of the page.     You will be asked to enter the access code listed below, as well as some personal information. Please follow the directions to create your username and password.     Your access code is: [email or web address]  Expires: 2018 11:17 AM     Your access code will  in 90 days. If you need help or a new code, please call your [place] clinic or [phone].        Care EveryWhere ID     This is your Care EveryWhere ID. This could be used by other organizations to access your [place] medical records  [id number]         Blood Pressure from Last 3 Encounters:   18 92/50   18 130/80   18 128/68    Weight from Last 3 Encounters:   18 93.4 kg (206 lb)   18 93.2 kg (205 lb 8 oz)   18 91.6 kg (202 lb)              Today, you had the following     No orders found for display       Primary Care Provider    None Specified       No primary provider on file.        Equal Access to Services     [facility]: Hadii chio fields hadfouziao Solazarus, waaxda luqadaha, qaybta kaalmada shravan, mayito sorensen . So [facility] [phone].    ATENCIÓN: Si habla español, tiene a ramirez disposición servicios gratuitos de asistencia lingüística. Llame al [phone].    We comply with applicable federal civil rights laws and [place] laws. We do not discriminate on the basis of race, color, national origin, age, disability, sex, sexual orientation, or gender identity.            Thank you!     Thank you for choosing [facility]  for your care. Our goal is always to provide you with excellent care. Hearing back from our patients is one way we can continue to improve our services. " Please take a few minutes to complete the written survey that you may receive in the mail after your visit with us. Thank you!             Your Updated Medication List - Protect others around you: Learn how to safely use, store and throw away your medicines at www.disposemymeds.org.          This list is accurate as of 2/14/18 10:41 AM.  Always use your most recent med list.                   Brand Name Dispense Instructions for use Diagnosis    atorvastatin 20 MG tablet    LIPITOR     Take 10 mg by mouth        busPIRone 10 MG tablet    BUSPAR     1 1/2 tabs twice daily        enzalutamide 40 MG capsule    XTANDI     Take 80 mg by mouth        gabapentin 300 MG capsule    NEURONTIN     2 tablets morning, 2 afternoon, 3 at bedtime.        HYDROcodone-acetaminophen 5-325 MG per tablet    NORCO     ONE  TABLET  TWICE PRN PAIN Limit acetaminophen to 4000 mg per day from all sources.        insulin glargine 100 UNIT/ML injection    LANTUS     Inject 30 units daily under the skin        ipratropium 17 MCG/ACT Inhaler    ATROVENT HFA     Inhale 2 puffs into the lungs        isosorbide mononitrate 30 MG 24 hr tablet    IMDUR     Take 30 mg by mouth        leuprolide 45 MG kit    LUPRON DEPOT     SHOT EVERY 6 MONTHS        losartan 25 MG tablet    COZAAR     Take 25 mg by mouth        metFORMIN 500 MG tablet    GLUCOPHAGE     Take 500 mg by mouth        nitroGLYcerin 0.4 MG sublingual tablet    NITROSTAT     Place 0.4 mg under the tongue        Simethicone 125 MG Tabs      Take 125 mg by mouth 3 times daily        tamsulosin 0.4 MG capsule    FLOMAX     Take 0.4 mg by mouth        Walker Misc      Front wheeled walker        warfarin 5 MG tablet    COUMADIN     2.5 mg daily   7.5 mg M-F  Monitored by irena BOLANOS

## 2018-02-15 ENCOUNTER — ALLIED HEALTH/NURSE VISIT (OUTPATIENT)
Dept: RADIATION ONCOLOGY | Facility: HOSPITAL | Age: 83
End: 2018-02-15

## 2018-02-15 DIAGNOSIS — C61 PROSTATE CANCER (H): Primary | ICD-10-CM

## 2018-02-15 PROCEDURE — 77385 ZZH IMRT TREATMENT DELIVERY, SIMPLE: CPT | Performed by: RADIOLOGY

## 2018-02-15 NOTE — PROGRESS NOTES
Chelsea Harden received radiation therapy treatment today 02/15/18.    Marcelo Payne  February 15, 2018  10:35 AM

## 2018-02-15 NOTE — MR AVS SNAPSHOT
"              After Visit Summary   2/15/2018    Chelsea Harden    MRN: [id number]           Patient Information     Date Of Birth          2/22/1934        Visit Information        Provider Department      2/15/2018 10:30 AM [facility]        Today's Diagnoses     Prostate cancer (H)    -  1       Follow-ups after your visit        Your next 10 appointments already scheduled     Feb 16, 2018 10:30 AM CST   Treatment with [facility]   [facility] ([facility] )    [address] [address] [phone]            Feb 19, 2018 10:30 AM CST   Treatment with [facility]   [facility] ([facility] )    [address] [address] [phone]              Who to contact     If you have questions or need follow up information about today's clinic visit or your schedule please contact [facility] directly at [phone].  Normal or non-critical lab and imaging results will be communicated to you by LoopFusehart, letter or phone within 4 business days after the clinic has received the results. If you do not hear from us within 7 days, please contact the clinic through Patient Feedt or phone. If you have a critical or abnormal lab result, we will notify you by phone as soon as possible.  Submit refill requests through [employer] or call your pharmacy and they will forward the refill request to us. Please allow 3 business days for your refill to be completed.          Additional Information About Your Visit        LoopFuseharAmberAds Information     [employer] lets you send messages to your doctor, view your test results, renew your prescriptions, schedule appointments and more. To sign up, go to [email or web address] . Click on \"Log in\" on the left side of the screen, which will take you to the Welcome page. Then click on \"Sign up Now\" on the right side of the page.     You will be asked to enter the access code listed below, as " well as some personal information. Please follow the directions to create your username and password.     Your access code is: [email or web address]  Expires: 2018 11:17 AM     Your access code will  in 90 days. If you need help or a new code, please call your [place] clinic or [phone].        Care EveryWhere ID     This is your Care EveryWhere ID. This could be used by other organizations to access your [place] medical records  [id number]         Blood Pressure from Last 3 Encounters:   18 110/64   18 92/50   18 130/80    Weight from Last 3 Encounters:   18 91.6 kg (202 lb)   18 93.4 kg (206 lb)   18 93.2 kg (205 lb 8 oz)              Today, you had the following     No orders found for display       Primary Care Provider    None Specified       No primary provider on file.        Equal Access to Services     CRISTINA ZAMARRIPA : Hadhalima Lombardo, vivian arzate, tash cheney, mayito sorensen . So [facility] [phone].    ATENCIÓN: Si habla español, tiene a ramirez disposición servicios gratuitos de asistencia lingüística. Llame al [phone].    We comply with applicable federal civil rights laws and [place] laws. We do not discriminate on the basis of race, color, national origin, age, disability, sex, sexual orientation, or gender identity.            Thank you!     Thank you for choosing [facility]  for your care. Our goal is always to provide you with excellent care. Hearing back from our patients is one way we can continue to improve our services. Please take a few minutes to complete the written survey that you may receive in the mail after your visit with us. Thank you!             Your Updated Medication List - Protect others around you: Learn how to safely use, store and throw away your medicines at www.disposemymeds.org.          This list is accurate as of 2/15/18 10:41 AM.  Always use your most recent med  list.                   Brand Name Dispense Instructions for use Diagnosis    atorvastatin 20 MG tablet    LIPITOR     Take 10 mg by mouth        busPIRone 10 MG tablet    BUSPAR     1 1/2 tabs twice daily        enzalutamide 40 MG capsule    XTANDI     Take 80 mg by mouth        gabapentin 300 MG capsule    NEURONTIN     2 tablets morning, 2 afternoon, 3 at bedtime.        HYDROcodone-acetaminophen 5-325 MG per tablet    NORCO     ONE  TABLET  TWICE PRN PAIN Limit acetaminophen to 4000 mg per day from all sources.        insulin glargine 100 UNIT/ML injection    LANTUS     Inject 30 units daily under the skin        ipratropium 17 MCG/ACT Inhaler    ATROVENT HFA     Inhale 2 puffs into the lungs        isosorbide mononitrate 30 MG 24 hr tablet    IMDUR     Take 30 mg by mouth        leuprolide 45 MG kit    LUPRON DEPOT     SHOT EVERY 6 MONTHS        losartan 25 MG tablet    COZAAR     Take 25 mg by mouth        metFORMIN 500 MG tablet    GLUCOPHAGE     Take 500 mg by mouth        nitroGLYcerin 0.4 MG sublingual tablet    NITROSTAT     Place 0.4 mg under the tongue        Simethicone 125 MG Tabs      Take 125 mg by mouth 3 times daily        tamsulosin 0.4 MG capsule    FLOMAX     Take 0.4 mg by mouth        Walker Misc      Front wheeled walker        warfarin 5 MG tablet    COUMADIN     2.5 mg daily   7.5 mg M-F  Monitored by irena BOLANOS

## 2018-02-16 ENCOUNTER — ALLIED HEALTH/NURSE VISIT (OUTPATIENT)
Dept: RADIATION ONCOLOGY | Facility: HOSPITAL | Age: 83
End: 2018-02-16

## 2018-02-16 DIAGNOSIS — C61 PROSTATE CANCER (H): Primary | ICD-10-CM

## 2018-02-16 PROCEDURE — 77385 ZZH IMRT TREATMENT DELIVERY, SIMPLE: CPT | Performed by: RADIOLOGY

## 2018-02-16 PROCEDURE — 77336 RADIATION PHYSICS CONSULT: CPT | Performed by: RADIOLOGY

## 2018-02-16 NOTE — PROGRESS NOTES
Chelsea Harden received radiation therapy treatment today 02/16/18.    Mary Avila  February 16, 2018  10:23 AM

## 2018-02-16 NOTE — MR AVS SNAPSHOT
"              After Visit Summary   2/16/2018    Chelsea Harden    MRN: [id number]           Patient Information     Date Of Birth          2/22/1934        Visit Information        Provider Department      2/16/2018 10:30 AM [facility]        Today's Diagnoses     Prostate cancer (H)    -  1       Follow-ups after your visit        Your next 10 appointments already scheduled     Feb 19, 2018 10:30 AM CST   Treatment with [facility]   [facility] ([facility] )    [address] [place] [address] [phone]              Who to contact     If you have questions or need follow up information about today's clinic visit or your schedule please contact [facility] directly at [phone].  Normal or non-critical lab and imaging results will be communicated to you by [employer], letter or phone within 4 business days after the clinic has received the results. If you do not hear from us within 7 days, please contact the clinic through [employer] or phone. If you have a critical or abnormal lab result, we will notify you by phone as soon as possible.  Submit refill requests through MobileAds or call your pharmacy and they will forward the refill request to us. Please allow 3 business days for your refill to be completed.          Additional Information About Your Visit        [employer]ras Libres Information     MobileAds lets you send messages to your doctor, view your test results, renew your prescriptions, schedule appointments and more. To sign up, go to [email or web address] . Click on \"Log in\" on the left side of the screen, which will take you to the Welcome page. Then click on \"Sign up Now\" on the right side of the page.     You will be asked to enter the access code listed below, as well as some personal information. Please follow the directions to create your username and password.     Your access code is: [email or web address]  Expires: 2/25/2018 11:17 AM     Your " access code will  in 90 days. If you need help or a new code, please call your [place] clinic or [phone].        Care EveryWhere ID     This is your Care EveryWhere ID. This could be used by other organizations to access your [place] medical records  [id number]         Blood Pressure from Last 3 Encounters:   18 110/64   18 92/50   18 130/80    Weight from Last 3 Encounters:   18 91.6 kg (202 lb)   18 93.4 kg (206 lb)   18 93.2 kg (205 lb 8 oz)              Today, you had the following     No orders found for display       Primary Care Provider    None Specified       No primary provider on file.        Equal Access to Services     CRISTINA ZAMARRIPA : David Lombardo, wasarahda carito, qaybta kaalmada shravan, mayito sorensen . So [facility] [phone].    ATENCIÓN: Si habla español, tiene a ramirez disposición servicios gratuitos de asistencia lingüística. Llame al [phone].    We comply with applicable federal civil rights laws and [place] laws. We do not discriminate on the basis of race, color, national origin, age, disability, sex, sexual orientation, or gender identity.            Thank you!     Thank you for choosing [facility]  for your care. Our goal is always to provide you with excellent care. Hearing back from our patients is one way we can continue to improve our services. Please take a few minutes to complete the written survey that you may receive in the mail after your visit with us. Thank you!             Your Updated Medication List - Protect others around you: Learn how to safely use, store and throw away your medicines at www.disposemymeds.org.          This list is accurate as of 18 10:43 AM.  Always use your most recent med list.                   Brand Name Dispense Instructions for use Diagnosis    atorvastatin 20 MG tablet    LIPITOR     Take 10 mg by mouth        busPIRone 10 MG tablet     BUSPAR     1 1/2 tabs twice daily        enzalutamide 40 MG capsule    XTANDI     Take 80 mg by mouth        gabapentin 300 MG capsule    NEURONTIN     2 tablets morning, 2 afternoon, 3 at bedtime.        HYDROcodone-acetaminophen 5-325 MG per tablet    NORCO     ONE  TABLET  TWICE PRN PAIN Limit acetaminophen to 4000 mg per day from all sources.        insulin glargine 100 UNIT/ML injection    LANTUS     Inject 30 units daily under the skin        ipratropium 17 MCG/ACT Inhaler    ATROVENT HFA     Inhale 2 puffs into the lungs        isosorbide mononitrate 30 MG 24 hr tablet    IMDUR     Take 30 mg by mouth        leuprolide 45 MG kit    LUPRON DEPOT     SHOT EVERY 6 MONTHS        losartan 25 MG tablet    COZAAR     Take 25 mg by mouth        metFORMIN 500 MG tablet    GLUCOPHAGE     Take 500 mg by mouth        nitroGLYcerin 0.4 MG sublingual tablet    NITROSTAT     Place 0.4 mg under the tongue        Simethicone 125 MG Tabs      Take 125 mg by mouth 3 times daily        tamsulosin 0.4 MG capsule    FLOMAX     Take 0.4 mg by mouth        Walker Misc      Front wheeled walker        warfarin 5 MG tablet    COUMADIN     2.5 mg daily   7.5 mg M-F  Monitored by irena BOLANOS

## 2018-02-19 ENCOUNTER — ALLIED HEALTH/NURSE VISIT (OUTPATIENT)
Dept: RADIATION ONCOLOGY | Facility: HOSPITAL | Age: 83
End: 2018-02-19

## 2018-02-19 ENCOUNTER — OFFICE VISIT (OUTPATIENT)
Dept: RADIATION ONCOLOGY | Facility: HOSPITAL | Age: 83
End: 2018-02-19

## 2018-02-19 VITALS
SYSTOLIC BLOOD PRESSURE: 108 MMHG | BODY MASS INDEX: 31.17 KG/M2 | WEIGHT: 205 LBS | DIASTOLIC BLOOD PRESSURE: 64 MMHG | HEART RATE: 60 BPM | RESPIRATION RATE: 16 BRPM

## 2018-02-19 DIAGNOSIS — C61 PROSTATE CANCER (H): Primary | ICD-10-CM

## 2018-02-19 PROCEDURE — 77385 ZZH IMRT TREATMENT DELIVERY, SIMPLE: CPT | Performed by: RADIOLOGY

## 2018-02-19 ASSESSMENT — PAIN SCALES - GENERAL: PAINLEVEL: MODERATE PAIN (4)

## 2018-02-19 NOTE — PROGRESS NOTES
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      TREATMENT INTENT:  Potential definitive, local control.      REGION TREATED:  Prostate and seminal vesicles.      PRIMARY TREATMENT TECHNIQUE:  IMRT (rapid).      ENERGY:  6 MV.      TUMOR DOSE:  7790 cGy.      NUMBER OF TREATMENTS:  41 fractions.      ELAPSED CALENDAR DAYS:  67 elapsed days.      COMPLETION DATE:  2018      COMMENTS:  Mr. 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  He tolerated his treatment quite well with just minimal GI and  morbidity.  Recovery should be uneventful.        We will plan to see him back for routine followup in 3 months.  I would be happy to re-evaluate him sooner if he has questions or concerns.         DAJA BERNARDO MD             D: 2018   T: 2018   MT: MATT      Name:     JOHN MCMANUS   MRN:      [id number]        Account:      [id number]   :      1934           Service Date: 2018      Document: [id number]

## 2018-02-19 NOTE — MR AVS SNAPSHOT
"              After Visit Summary   2/19/2018    Chelsea aHrden    MRN: [id number]           Patient Information     Date Of Birth          2/22/1934        Visit Information        Provider Department      2/19/2018 10:30 AM [facility]        Today's Diagnoses     Prostate cancer (H)    -  1       Follow-ups after your visit        Your next 10 appointments already scheduled     Feb 19, 2018 10:45 AM CST   on treatment visit with Emerson Costello MD   [facility] ([facility] )    [address] [place] [id number] [phone]              Who to contact     If you have questions or need follow up information about today's clinic visit or your schedule please contact [facility] directly at [phone].  Normal or non-critical lab and imaging results will be communicated to you by [employer] Accessories[employer], letter or phone within 4 business days after the clinic has received the results. If you do not hear from us within 7 days, please contact the clinic through [employer] or phone. If you have a critical or abnormal lab result, we will notify you by phone as soon as possible.  Submit refill requests through [employer] or call your pharmacy and they will forward the refill request to us. Please allow 3 business days for your refill to be completed.          Additional Information About Your Visit        EyeScribes Information     [employer]es lets you send messages to your doctor, view your test results, renew your prescriptions, schedule appointments and more. To sign up, go to [email or web address] . Click on \"Log in\" on the left side of the screen, which will take you to the Welcome page. Then click on \"Sign up Now\" on the right side of the page.     You will be asked to enter the access code listed below, as well as some personal information. Please follow the directions to create your username and password.     Your access code is: [email or web address]  Expires: 2/25/2018 11:17 " AM     Your access code will  in 90 days. If you need help or a new code, please call your [facility] clinic or [phone].        Care EveryWhere ID     This is your Care EveryWhere ID. This could be used by other organizations to access your [facility] medical records  [id number]         Blood Pressure from Last 3 Encounters:   18 110/64   18 92/50   18 130/80    Weight from Last 3 Encounters:   18 91.6 kg (202 lb)   18 93.4 kg (206 lb)   18 93.2 kg (205 lb 8 oz)              Today, you had the following     No orders found for display       Primary Care Provider    None Specified       No primary provider on file.        Equal Access to Services     CRISTINA ZAMARRIPA : Hadii chio Lombardo, wasarahda luqadaha, qaybta kaalmada adecarito, mayito sorensen . So [facility] [phone].    ATENCIÓN: Si habla español, tiene a ramirez disposición servicios gratuitos de asistencia lingüística. Llame al [phone].    We comply with applicable federal civil rights laws and [place] laws. We do not discriminate on the basis of race, color, national origin, age, disability, sex, sexual orientation, or gender identity.            Thank you!     Thank you for choosing [facility]  for your care. Our goal is always to provide you with excellent care. Hearing back from our patients is one way we can continue to improve our services. Please take a few minutes to complete the written survey that you may receive in the mail after your visit with us. Thank you!             Your Updated Medication List - Protect others around you: Learn how to safely use, store and throw away your medicines at www.disposemymeds.org.          This list is accurate as of 18 10:34 AM.  Always use your most recent med list.                   Brand Name Dispense Instructions for use Diagnosis    atorvastatin 20 MG tablet    LIPITOR     Take 10 mg by mouth        busPIRone 10 MG  tablet    BUSPAR     1 1/2 tabs twice daily        enzalutamide 40 MG capsule    XTANDI     Take 80 mg by mouth        gabapentin 300 MG capsule    NEURONTIN     2 tablets morning, 2 afternoon, 3 at bedtime.        HYDROcodone-acetaminophen 5-325 MG per tablet    NORCO     ONE  TABLET  TWICE PRN PAIN Limit acetaminophen to 4000 mg per day from all sources.        insulin glargine 100 UNIT/ML injection    LANTUS     Inject 30 units daily under the skin        ipratropium 17 MCG/ACT Inhaler    ATROVENT HFA     Inhale 2 puffs into the lungs        isosorbide mononitrate 30 MG 24 hr tablet    IMDUR     Take 30 mg by mouth        leuprolide 45 MG kit    LUPRON DEPOT     SHOT EVERY 6 MONTHS        losartan 25 MG tablet    COZAAR     Take 25 mg by mouth        metFORMIN 500 MG tablet    GLUCOPHAGE     Take 500 mg by mouth        nitroGLYcerin 0.4 MG sublingual tablet    NITROSTAT     Place 0.4 mg under the tongue        Simethicone 125 MG Tabs      Take 125 mg by mouth 3 times daily        tamsulosin 0.4 MG capsule    FLOMAX     Take 0.4 mg by mouth        Walker Misc      Front wheeled walker        warfarin 5 MG tablet    COUMADIN     2.5 mg daily   7.5 mg M-F  Monitored by irena BOLANOS

## 2018-02-19 NOTE — PROGRESS NOTES
"Patient was assessed using the NCCN psychosocial distress thermometer. Patient rated the score as a 3-4/10. Patient rated current stressors as \"the driving over here every day\". Stressors will be brought to the attention of provider or Oncology RN Care Coordinator for a score of 6 or greater or per nurses discretion.           "

## 2018-02-19 NOTE — MR AVS SNAPSHOT
"              After Visit Summary   2/19/2018    Chelsea Harden    MRN: [id number]           Patient Information     Date Of Birth          2/22/1934        Visit Information        Provider Department      2/19/2018 10:45 AM Emerson Costello MD [facility]        Today's Diagnoses     Prostate cancer (H)    -  1       Follow-ups after your visit        Your next 10 appointments already scheduled     May 21, 2018 10:00 AM CDT   Follow Up with Emerson Costello MD   [facility] ([facility] )    [address] [place] [id number] [phone]              Who to contact     If you have questions or need follow up information about today's clinic visit or your schedule please contact [facility] directly at [phone].  Normal or non-critical lab and imaging results will be communicated to you by L[employer] W[employer] Doodlehart, letter or phone within 4 business days after the clinic has received the results. If you do not hear from us within 7 days, please contact the clinic through L[employer] W[employer] Doodlehart or phone. If you have a critical or abnormal lab result, we will notify you by phone as soon as possible.  Submit refill requests through [employer] or call your pharmacy and they will forward the refill request to us. Please allow 3 business days for your refill to be completed.          Additional Information About Your Visit        L[employer] Doodlehart Information     [employer] lets you send messages to your doctor, view your test results, renew your prescriptions, schedule appointments and more. To sign up, go to [email or web address] . Click on \"Log in\" on the left side of the screen, which will take you to the Welcome page. Then click on \"Sign up Now\" on the right side of the page.     You will be asked to enter the access code listed below, as well as some personal information. Please follow the directions to create your username and password.     Your access code is: [email or web address]  Expires: 2/25/2018 11:17 " AM     Your access code will  in 90 days. If you need help or a new code, please call your [facility] clinic or [phone].        Care EveryWhere ID     This is your Care EveryWhere ID. This could be used by other organizations to access your [facility] medical records  [id number]        Your Vitals Were     Pulse Respirations BMI (Body Mass Index)             60 16 31.17 kg/m2          Blood Pressure from Last 3 Encounters:   18 108/64   18 110/64   18 92/50    Weight from Last 3 Encounters:   18 93 kg (205 lb)   18 91.6 kg (202 lb)   18 93.4 kg (206 lb)              Today, you had the following     No orders found for display       Primary Care Provider    None Specified       No primary provider on file.        Equal Access to Services     [facility]: David Lombardo, vivian arzate, tash cheney, mayito sorensen . So [facility] [phone].    ATENCIÓN: Si habla español, tiene a ramirez disposición servicios gratuitos de asistencia lingüística. Luis Miguel al [phone].    We comply with applicable federal civil rights laws and [place] laws. We do not discriminate on the basis of race, color, national origin, age, disability, sex, sexual orientation, or gender identity.            Thank you!     Thank you for choosing [facility]  for your care. Our goal is always to provide you with excellent care. Hearing back from our patients is one way we can continue to improve our services. Please take a few minutes to complete the written survey that you may receive in the mail after your visit with us. Thank you!             Your Updated Medication List - Protect others around you: Learn how to safely use, store and throw away your medicines at www.disposemymeds.org.          This list is accurate as of 18  2:56 PM.  Always use your most recent med list.                   Brand Name Dispense Instructions for use  Diagnosis    atorvastatin 20 MG tablet    LIPITOR     Take 10 mg by mouth        busPIRone 10 MG tablet    BUSPAR     1 1/2 tabs twice daily        enzalutamide 40 MG capsule    XTANDI     Take 80 mg by mouth        gabapentin 300 MG capsule    NEURONTIN     2 tablets morning, 2 afternoon, 3 at bedtime.        HYDROcodone-acetaminophen 5-325 MG per tablet    NORCO     ONE  TABLET  TWICE PRN PAIN Limit acetaminophen to 4000 mg per day from all sources.        insulin glargine 100 UNIT/ML injection    LANTUS     Inject 30 units daily under the skin        ipratropium 17 MCG/ACT Inhaler    ATROVENT HFA     Inhale 2 puffs into the lungs        isosorbide mononitrate 30 MG 24 hr tablet    IMDUR     Take 30 mg by mouth        leuprolide 45 MG kit    LUPRON DEPOT     SHOT EVERY 6 MONTHS        losartan 25 MG tablet    COZAAR     Take 25 mg by mouth        metFORMIN 500 MG tablet    GLUCOPHAGE     Take 500 mg by mouth        nitroGLYcerin 0.4 MG sublingual tablet    NITROSTAT     Place 0.4 mg under the tongue        Simethicone 125 MG Tabs      Take 125 mg by mouth 3 times daily        tamsulosin 0.4 MG capsule    FLOMAX     Take 0.4 mg by mouth        Walker Misc      Front wheeled walker        warfarin 5 MG tablet    COUMADIN     2.5 mg daily   7.5 mg M-F  Monitored by irena BOLANOS

## 2018-02-19 NOTE — PROGRESS NOTES
Chelsea Harden received radiation therapy treatment today 02/19/18.    Rochelle Ortiz  February 19, 2018  10:32 AM

## 2018-02-20 ASSESSMENT — PATIENT HEALTH QUESTIONNAIRE - PHQ9: SUM OF ALL RESPONSES TO PHQ QUESTIONS 1-9: 4

## 2018-05-21 DIAGNOSIS — Z12.5 SPECIAL SCREENING FOR MALIGNANT NEOPLASM OF PROSTATE: Primary | ICD-10-CM
